# Patient Record
Sex: FEMALE | Race: OTHER | HISPANIC OR LATINO | URBAN - METROPOLITAN AREA
[De-identification: names, ages, dates, MRNs, and addresses within clinical notes are randomized per-mention and may not be internally consistent; named-entity substitution may affect disease eponyms.]

---

## 2017-07-22 ENCOUNTER — EMERGENCY (EMERGENCY)
Facility: HOSPITAL | Age: 47
LOS: 1 days | Discharge: HOME | End: 2017-07-22
Attending: EMERGENCY MEDICINE
Payer: MEDICAID

## 2017-07-22 VITALS
HEIGHT: 62 IN | TEMPERATURE: 100 F | WEIGHT: 199.96 LBS | SYSTOLIC BLOOD PRESSURE: 120 MMHG | RESPIRATION RATE: 20 BRPM | OXYGEN SATURATION: 96 % | DIASTOLIC BLOOD PRESSURE: 83 MMHG | HEART RATE: 92 BPM

## 2017-07-22 PROCEDURE — 99283 EMERGENCY DEPT VISIT LOW MDM: CPT | Mod: 25

## 2017-07-22 PROCEDURE — T1013: CPT

## 2017-07-22 PROCEDURE — 73562 X-RAY EXAM OF KNEE 3: CPT | Mod: 26,RT

## 2017-07-22 PROCEDURE — 99283 EMERGENCY DEPT VISIT LOW MDM: CPT

## 2017-07-22 PROCEDURE — 73562 X-RAY EXAM OF KNEE 3: CPT

## 2017-07-22 RX ORDER — IBUPROFEN 200 MG
1 TABLET ORAL
Qty: 28 | Refills: 0 | OUTPATIENT
Start: 2017-07-22 | End: 2017-07-29

## 2017-07-22 RX ORDER — METHOCARBAMOL 500 MG/1
2 TABLET, FILM COATED ORAL
Qty: 18 | Refills: 0 | OUTPATIENT
Start: 2017-07-22 | End: 2017-07-25

## 2017-07-22 RX ORDER — IBUPROFEN 200 MG
600 TABLET ORAL ONCE
Qty: 0 | Refills: 0 | Status: COMPLETED | OUTPATIENT
Start: 2017-07-22 | End: 2017-07-22

## 2017-07-22 RX ADMIN — Medication 600 MILLIGRAM(S): at 14:46

## 2017-07-22 NOTE — ED ADULT TRIAGE NOTE - CHIEF COMPLAINT QUOTE
pt reports pain to her right foot and knee since yesterday. pt denies injury and reports decreased ability to bend her knee

## 2017-07-22 NOTE — ED STATDOCS - ATTENDING CONTRIBUTION TO CARE
I, France Adams, performed the initial face to face bedside interview with this patient regarding history of present illness, review of symptoms and relevant past medical, social and family history.  I completed an independent physical examination.  I was the initial provider who evaluated this patient. I have signed out the follow up of any pending tests (i.e. labs, radiological studies) to the ACP.  I have communicated the patient’s plan of care and disposition with the ACP.  The history, relevant review of systems, past medical and surgical history, medical decision making, and physical examination was documented by the scribe in my presence and I attest to the accuracy of the documentation.

## 2017-07-22 NOTE — ED STATDOCS - PROGRESS NOTE DETAILS
Pts xray negative for acute bony pathology. Pt stable for d/c as per intake doc care plan. Pts xray negative for acute bony pathology. Upon further evaluation, pt describing a sciatica like pain where she has a shooting pain down her leg, worse with leg extension. Pt stable for d/c as per intake doc care plan.

## 2017-07-22 NOTE — ED STATDOCS - OBJECTIVE STATEMENT
47 y/o F pt presents to ED c/o right knee pain s/p banging her knee on her bed last night. Pain worsens with movement. No other injuries. No weakness, numbness. No further complaints at this time.

## 2017-12-09 ENCOUNTER — EMERGENCY (EMERGENCY)
Facility: HOSPITAL | Age: 47
LOS: 1 days | Discharge: HOME | End: 2017-12-09
Attending: EMERGENCY MEDICINE | Admitting: EMERGENCY MEDICINE
Payer: MEDICAID

## 2017-12-09 VITALS
WEIGHT: 190.04 LBS | TEMPERATURE: 98 F | HEART RATE: 78 BPM | HEIGHT: 63 IN | RESPIRATION RATE: 18 BRPM | DIASTOLIC BLOOD PRESSURE: 81 MMHG | SYSTOLIC BLOOD PRESSURE: 109 MMHG | OXYGEN SATURATION: 99 %

## 2017-12-09 DIAGNOSIS — F41.1 GENERALIZED ANXIETY DISORDER: ICD-10-CM

## 2017-12-09 DIAGNOSIS — R69 ILLNESS, UNSPECIFIED: ICD-10-CM

## 2017-12-09 LAB
ALBUMIN SERPL ELPH-MCNC: 4 G/DL — SIGNIFICANT CHANGE UP (ref 3.3–5.2)
ALP SERPL-CCNC: 60 U/L — SIGNIFICANT CHANGE UP (ref 40–120)
ALT FLD-CCNC: 15 U/L — SIGNIFICANT CHANGE UP
ANION GAP SERPL CALC-SCNC: 13 MMOL/L — SIGNIFICANT CHANGE UP (ref 5–17)
APPEARANCE UR: ABNORMAL
AST SERPL-CCNC: 20 U/L — SIGNIFICANT CHANGE UP
BACTERIA # UR AUTO: ABNORMAL
BASOPHILS # BLD AUTO: 0.1 K/UL — SIGNIFICANT CHANGE UP (ref 0–0.2)
BASOPHILS NFR BLD AUTO: 1.2 % — SIGNIFICANT CHANGE UP (ref 0–2)
BILIRUB SERPL-MCNC: 0.5 MG/DL — SIGNIFICANT CHANGE UP (ref 0.4–2)
BILIRUB UR-MCNC: NEGATIVE — SIGNIFICANT CHANGE UP
BUN SERPL-MCNC: 8 MG/DL — SIGNIFICANT CHANGE UP (ref 8–20)
CALCIUM SERPL-MCNC: 9 MG/DL — SIGNIFICANT CHANGE UP (ref 8.6–10.2)
CHLORIDE SERPL-SCNC: 99 MMOL/L — SIGNIFICANT CHANGE UP (ref 98–107)
CO2 SERPL-SCNC: 24 MMOL/L — SIGNIFICANT CHANGE UP (ref 22–29)
COLOR SPEC: YELLOW — SIGNIFICANT CHANGE UP
CREAT SERPL-MCNC: 0.57 MG/DL — SIGNIFICANT CHANGE UP (ref 0.5–1.3)
DIFF PNL FLD: NEGATIVE — SIGNIFICANT CHANGE UP
EOSINOPHIL # BLD AUTO: 0.1 K/UL — SIGNIFICANT CHANGE UP (ref 0–0.5)
EOSINOPHIL NFR BLD AUTO: 2.6 % — SIGNIFICANT CHANGE UP (ref 0–6)
EPI CELLS # UR: SIGNIFICANT CHANGE UP
GLUCOSE SERPL-MCNC: 109 MG/DL — SIGNIFICANT CHANGE UP (ref 70–115)
GLUCOSE UR QL: NEGATIVE MG/DL — SIGNIFICANT CHANGE UP
HCG UR QL: NEGATIVE — SIGNIFICANT CHANGE UP
HCT VFR BLD CALC: 43 % — SIGNIFICANT CHANGE UP (ref 37–47)
HGB BLD-MCNC: 13.8 G/DL — SIGNIFICANT CHANGE UP (ref 12–16)
KETONES UR-MCNC: ABNORMAL
LEUKOCYTE ESTERASE UR-ACNC: ABNORMAL
LYMPHOCYTES # BLD AUTO: 2.5 K/UL — SIGNIFICANT CHANGE UP (ref 1–4.8)
LYMPHOCYTES # BLD AUTO: 49.6 % — SIGNIFICANT CHANGE UP (ref 20–55)
MCHC RBC-ENTMCNC: 31.1 PG — HIGH (ref 27–31)
MCHC RBC-ENTMCNC: 32.1 G/DL — SIGNIFICANT CHANGE UP (ref 32–36)
MCV RBC AUTO: 96.8 FL — SIGNIFICANT CHANGE UP (ref 81–99)
MONOCYTES # BLD AUTO: 0.3 K/UL — SIGNIFICANT CHANGE UP (ref 0–0.8)
MONOCYTES NFR BLD AUTO: 6.4 % — SIGNIFICANT CHANGE UP (ref 3–10)
NEUTROPHILS # BLD AUTO: 2 K/UL — SIGNIFICANT CHANGE UP (ref 1.8–8)
NEUTROPHILS NFR BLD AUTO: 39.8 % — SIGNIFICANT CHANGE UP (ref 37–73)
NITRITE UR-MCNC: NEGATIVE — SIGNIFICANT CHANGE UP
PH UR: 6 — SIGNIFICANT CHANGE UP (ref 5–8)
PLATELET # BLD AUTO: 281 K/UL — SIGNIFICANT CHANGE UP (ref 150–400)
POTASSIUM SERPL-MCNC: 4 MMOL/L — SIGNIFICANT CHANGE UP (ref 3.5–5.3)
POTASSIUM SERPL-SCNC: 4 MMOL/L — SIGNIFICANT CHANGE UP (ref 3.5–5.3)
PROT SERPL-MCNC: 7.1 G/DL — SIGNIFICANT CHANGE UP (ref 6.6–8.7)
PROT UR-MCNC: 15 MG/DL
RBC # BLD: 4.44 M/UL — SIGNIFICANT CHANGE UP (ref 4.4–5.2)
RBC # FLD: 13.2 % — SIGNIFICANT CHANGE UP (ref 11–15.6)
RBC CASTS # UR COMP ASSIST: SIGNIFICANT CHANGE UP /HPF (ref 0–4)
SODIUM SERPL-SCNC: 136 MMOL/L — SIGNIFICANT CHANGE UP (ref 135–145)
SP GR SPEC: 1.02 — SIGNIFICANT CHANGE UP (ref 1.01–1.02)
T3 SERPL-MCNC: 90 NG/DL — SIGNIFICANT CHANGE UP (ref 80–200)
T4 AB SER-ACNC: 7.2 UG/DL — SIGNIFICANT CHANGE UP (ref 4.5–12)
TROPONIN T SERPL-MCNC: <0.01 NG/ML — SIGNIFICANT CHANGE UP (ref 0–0.06)
TSH SERPL-MCNC: 0.74 UIU/ML — SIGNIFICANT CHANGE UP (ref 0.27–4.2)
UROBILINOGEN FLD QL: NEGATIVE MG/DL — SIGNIFICANT CHANGE UP
WBC # BLD: 5 K/UL — SIGNIFICANT CHANGE UP (ref 4.8–10.8)
WBC # FLD AUTO: 5 K/UL — SIGNIFICANT CHANGE UP (ref 4.8–10.8)
WBC UR QL: SIGNIFICANT CHANGE UP

## 2017-12-09 PROCEDURE — 81001 URINALYSIS AUTO W/SCOPE: CPT

## 2017-12-09 PROCEDURE — 84443 ASSAY THYROID STIM HORMONE: CPT

## 2017-12-09 PROCEDURE — 93005 ELECTROCARDIOGRAM TRACING: CPT

## 2017-12-09 PROCEDURE — 81025 URINE PREGNANCY TEST: CPT

## 2017-12-09 PROCEDURE — 90792 PSYCH DIAG EVAL W/MED SRVCS: CPT

## 2017-12-09 PROCEDURE — 99284 EMERGENCY DEPT VISIT MOD MDM: CPT | Mod: 25

## 2017-12-09 PROCEDURE — 85027 COMPLETE CBC AUTOMATED: CPT

## 2017-12-09 PROCEDURE — T1013: CPT

## 2017-12-09 PROCEDURE — 84484 ASSAY OF TROPONIN QUANT: CPT

## 2017-12-09 PROCEDURE — 84480 ASSAY TRIIODOTHYRONINE (T3): CPT

## 2017-12-09 PROCEDURE — 80053 COMPREHEN METABOLIC PANEL: CPT

## 2017-12-09 PROCEDURE — 93010 ELECTROCARDIOGRAM REPORT: CPT

## 2017-12-09 PROCEDURE — 36415 COLL VENOUS BLD VENIPUNCTURE: CPT

## 2017-12-09 PROCEDURE — 84436 ASSAY OF TOTAL THYROXINE: CPT

## 2017-12-09 PROCEDURE — 99284 EMERGENCY DEPT VISIT MOD MDM: CPT

## 2017-12-09 RX ORDER — SERTRALINE 25 MG/1
1 TABLET, FILM COATED ORAL
Qty: 7 | Refills: 0 | OUTPATIENT
Start: 2017-12-09 | End: 2017-12-16

## 2017-12-09 NOTE — ED BEHAVIORAL HEALTH ASSESSMENT NOTE - SUMMARY
46 year old DF who no prior history of psychiatric treatment, went to her PMD recently for anxiety/ panic like symptoms that she describes as worry, anxiety, tremulousness, palpitations sometimes occurring once daily. She cannot describe any events in her personal life that would trigger this except she believes she is going into perimenopause.

## 2017-12-09 NOTE — ED STATDOCS - PROGRESS NOTE DETAILS
psychiatry called, will see pt after tests are completed. Attending note. Patient has no acute medical problem. Psych feels patient is having anxiety disorder will place on zoloft

## 2017-12-09 NOTE — ED STATDOCS - ATTENDING CONTRIBUTION TO CARE
I, Rogers Coon, performed the initial face to face bedside interview with this patient regarding history of present illness, review of symptoms and relevant past medical, social and family history.  I completed an independent physical examination.  I was the initial provider who evaluated this patient. I have signed out the follow up of any pending tests (i.e. labs, radiological studies) to the ACP.  I have communicated the patient’s plan of care and disposition with the ACP.

## 2017-12-09 NOTE — ED STATDOCS - OBJECTIVE STATEMENT
48 y/o F presents to the ED c/o intermittent anxiety attacks today. She states that during the attack, her heart beat was rapid and she "felt like something is going to happen". After the anxiety attack subsided, pt states she was left with a nervous feeling. Pt states these attacks onset at random. Pt had her sx evaluated by PMD but received no prescription for medication. Denies fever, chills, recent stress. Currently takes Prednisone. No psychiatric PMHx. 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 LNMP 1 week ago. Non-smoker, no EtOH use. NKDA. No further complaints at this time. PMD is [facility].  utilized to obtain history.

## 2017-12-09 NOTE — ED BEHAVIORAL HEALTH ASSESSMENT NOTE - SUICIDE PROTECTIVE FACTORS
Future oriented/Identifies reasons for living/Responsibility to family and others/Supportive social network or family/Ability to cope with stress

## 2017-12-09 NOTE — ED BEHAVIORAL HEALTH ASSESSMENT NOTE - HPI (INCLUDE ILLNESS QUALITY, SEVERITY, DURATION, TIMING, CONTEXT, MODIFYING FACTORS, ASSOCIATED SIGNS AND SYMPTOMS)
The patient describes herself as a happy person but recently believes she is going into menopause over past 3 three months. She has had bouts of anxiety over the past three months almost daily with some palpitations, feeling nervous and some shakiness. She does not equate this to any changes in her life. She has been on prednisone for years since surgery for a pituitary tumor at age 17years. There is no prior history of psychiatric treatment

## 2017-12-09 NOTE — ED ADULT NURSE NOTE - CHIEF COMPLAINT QUOTE
pt alert and awake x3, arrived with feelings of anxiety. states gets shaky and heart palpitations while driving and at night, has stress in her life. denies chest pain, denies SI/HI

## 2017-12-09 NOTE — ED ADULT NURSE NOTE - OBJECTIVE STATEMENT
pt presents to ED with feeling anxious. pt states she thinks something is going to happen, feels nervous and then she feels like her heart is racing. denies chest pain/sob. breathing si even and unlabored, a&ox3, will continue to monitor and reassess

## 2017-12-09 NOTE — ED BEHAVIORAL HEALTH ASSESSMENT NOTE - DESCRIPTION
Medical workup done and patient to be given script for Zoloft 25mg po daily. She does not feel she needs counseling but given number of family service in case she changes her mind. Follow-up on Zoloft will be with her PMD in community. 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

## 2017-12-12 VITALS
RESPIRATION RATE: 18 BRPM | HEART RATE: 102 BPM | OXYGEN SATURATION: 98 % | SYSTOLIC BLOOD PRESSURE: 117 MMHG | TEMPERATURE: 98 F | DIASTOLIC BLOOD PRESSURE: 60 MMHG

## 2018-09-23 ENCOUNTER — EMERGENCY (EMERGENCY)
Facility: HOSPITAL | Age: 48
LOS: 1 days | Discharge: HOME | End: 2018-09-23
Attending: EMERGENCY MEDICINE
Payer: MEDICAID

## 2018-09-23 VITALS
OXYGEN SATURATION: 96 % | HEART RATE: 69 BPM | TEMPERATURE: 98 F | RESPIRATION RATE: 18 BRPM | SYSTOLIC BLOOD PRESSURE: 106 MMHG | DIASTOLIC BLOOD PRESSURE: 74 MMHG

## 2018-09-23 VITALS — WEIGHT: 210.1 LBS | HEIGHT: 62 IN

## 2018-09-23 PROCEDURE — 70450 CT HEAD/BRAIN W/O DYE: CPT

## 2018-09-23 PROCEDURE — 72125 CT NECK SPINE W/O DYE: CPT

## 2018-09-23 PROCEDURE — 70450 CT HEAD/BRAIN W/O DYE: CPT | Mod: 26

## 2018-09-23 PROCEDURE — 99284 EMERGENCY DEPT VISIT MOD MDM: CPT | Mod: 25

## 2018-09-23 PROCEDURE — 72125 CT NECK SPINE W/O DYE: CPT | Mod: 26

## 2018-09-23 PROCEDURE — T1013: CPT

## 2018-09-23 PROCEDURE — 99284 EMERGENCY DEPT VISIT MOD MDM: CPT

## 2018-09-23 RX ORDER — IBUPROFEN 200 MG
1 TABLET ORAL
Qty: 40 | Refills: 0 | OUTPATIENT
Start: 2018-09-23 | End: 2018-10-02

## 2018-09-23 NOTE — ED ADULT TRIAGE NOTE - CHIEF COMPLAINT QUOTE
"I have pain in my head, neck, arm and leg that started 2 weeks ago. I saw my doctor 2 weeks ago she gave me Ibuprofen 600 but only helps for a few hours. " Pt states the doctor told her it might be neurological.  Pt denies HBP.

## 2018-09-23 NOTE — ED PROVIDER NOTE - ATTENDING CONTRIBUTION TO CARE
49 yo hx of F hx of pituitary tumor presented with headache and neck pain. no nuasea. no vomting. on exam, no neurological deficit. Mild neck tendneress on palpation. CT head wnl. Rick recommend to get MRI outpatient and follow up with PMD.

## 2018-09-23 NOTE — ED PROVIDER NOTE - OBJECTIVE STATEMENT
47 yo M pmh pituitary tumor s/p resection followed by NS with yearly MRI up until 5-10 years ago presented to ED with c/o  left sided HA, eye pressure and pain down left arm x 3 weeks. Pt denies any injury. NO weakness. NO fevers/chills. No HA. No CP or SOB. Pt was seen by [facility] clinic and started on Motrin with relief but concerned that s/s persist.

## 2018-09-23 NOTE — ED ADULT NURSE NOTE - OBJECTIVE STATEMENT
Patient arrived to ED today with c/o head pain, neck pain, arm pain and leg pain for the past 2 weeks.  Patient states she is taking Motrin with no relief.

## 2018-09-23 NOTE — ED PROVIDER NOTE - CHPI ED SYMPTOMS NEG
no weakness/no change in level of consciousness/no vomiting/no dizziness/no fever/no confusion/no loss of consciousness/no nausea/no numbness/no blurred vision

## 2018-09-23 NOTE — ED PROVIDER NOTE - MEDICAL DECISION MAKING DETAILS
Left sided HA, eye pressure, and neck pain with h/o pituitary tumor. Will check CT and if normal refer to out-pt neurology.

## 2019-02-17 ENCOUNTER — EMERGENCY (EMERGENCY)
Facility: HOSPITAL | Age: 49
LOS: 1 days | Discharge: HOME | End: 2019-02-17
Attending: EMERGENCY MEDICINE
Payer: MEDICAID

## 2019-02-17 VITALS
HEART RATE: 80 BPM | OXYGEN SATURATION: 98 % | DIASTOLIC BLOOD PRESSURE: 78 MMHG | TEMPERATURE: 99 F | SYSTOLIC BLOOD PRESSURE: 117 MMHG | RESPIRATION RATE: 18 BRPM

## 2019-02-17 VITALS
TEMPERATURE: 98 F | OXYGEN SATURATION: 97 % | DIASTOLIC BLOOD PRESSURE: 72 MMHG | WEIGHT: 214.95 LBS | SYSTOLIC BLOOD PRESSURE: 120 MMHG | HEART RATE: 85 BPM | RESPIRATION RATE: 18 BRPM

## 2019-02-17 DIAGNOSIS — Z90.49 ACQUIRED ABSENCE OF OTHER SPECIFIED PARTS OF DIGESTIVE TRACT: Chronic | ICD-10-CM

## 2019-02-17 LAB
ALBUMIN SERPL ELPH-MCNC: 3.7 G/DL — SIGNIFICANT CHANGE UP (ref 3.3–5.2)
ALP SERPL-CCNC: 59 U/L — SIGNIFICANT CHANGE UP (ref 40–120)
ALT FLD-CCNC: 13 U/L — SIGNIFICANT CHANGE UP
ANION GAP SERPL CALC-SCNC: 12 MMOL/L — SIGNIFICANT CHANGE UP (ref 5–17)
AST SERPL-CCNC: 20 U/L — SIGNIFICANT CHANGE UP
BILIRUB SERPL-MCNC: 0.3 MG/DL — LOW (ref 0.4–2)
BUN SERPL-MCNC: 8 MG/DL — SIGNIFICANT CHANGE UP (ref 8–20)
CALCIUM SERPL-MCNC: 8 MG/DL — LOW (ref 8.6–10.2)
CHLORIDE SERPL-SCNC: 103 MMOL/L — SIGNIFICANT CHANGE UP (ref 98–107)
CO2 SERPL-SCNC: 24 MMOL/L — SIGNIFICANT CHANGE UP (ref 22–29)
CREAT SERPL-MCNC: 0.61 MG/DL — SIGNIFICANT CHANGE UP (ref 0.5–1.3)
GLUCOSE SERPL-MCNC: 89 MG/DL — SIGNIFICANT CHANGE UP (ref 70–115)
HCT VFR BLD CALC: 41.3 % — SIGNIFICANT CHANGE UP (ref 37–47)
HGB BLD-MCNC: 13.7 G/DL — SIGNIFICANT CHANGE UP (ref 12–16)
LIDOCAIN IGE QN: 23 U/L — SIGNIFICANT CHANGE UP (ref 22–51)
MAGNESIUM SERPL-MCNC: 2.3 MG/DL — SIGNIFICANT CHANGE UP (ref 1.6–2.6)
MCHC RBC-ENTMCNC: 32.1 PG — HIGH (ref 27–31)
MCHC RBC-ENTMCNC: 33.2 G/DL — SIGNIFICANT CHANGE UP (ref 32–36)
MCV RBC AUTO: 96.7 FL — SIGNIFICANT CHANGE UP (ref 81–99)
PLATELET # BLD AUTO: 263 K/UL — SIGNIFICANT CHANGE UP (ref 150–400)
POTASSIUM SERPL-MCNC: 3.2 MMOL/L — LOW (ref 3.5–5.3)
POTASSIUM SERPL-SCNC: 3.2 MMOL/L — LOW (ref 3.5–5.3)
PROT SERPL-MCNC: 6.9 G/DL — SIGNIFICANT CHANGE UP (ref 6.6–8.7)
RBC # BLD: 4.27 M/UL — LOW (ref 4.4–5.2)
RBC # FLD: 13.4 % — SIGNIFICANT CHANGE UP (ref 11–15.6)
SODIUM SERPL-SCNC: 139 MMOL/L — SIGNIFICANT CHANGE UP (ref 135–145)
WBC # BLD: 4.6 K/UL — LOW (ref 4.8–10.8)
WBC # FLD AUTO: 4.6 K/UL — LOW (ref 4.8–10.8)

## 2019-02-17 PROCEDURE — 84702 CHORIONIC GONADOTROPIN TEST: CPT

## 2019-02-17 PROCEDURE — 83735 ASSAY OF MAGNESIUM: CPT

## 2019-02-17 PROCEDURE — 99284 EMERGENCY DEPT VISIT MOD MDM: CPT | Mod: 25

## 2019-02-17 PROCEDURE — 96374 THER/PROPH/DIAG INJ IV PUSH: CPT | Mod: XU

## 2019-02-17 PROCEDURE — 80053 COMPREHEN METABOLIC PANEL: CPT

## 2019-02-17 PROCEDURE — 74177 CT ABD & PELVIS W/CONTRAST: CPT

## 2019-02-17 PROCEDURE — 36415 COLL VENOUS BLD VENIPUNCTURE: CPT

## 2019-02-17 PROCEDURE — 85027 COMPLETE CBC AUTOMATED: CPT

## 2019-02-17 PROCEDURE — 74177 CT ABD & PELVIS W/CONTRAST: CPT | Mod: 26

## 2019-02-17 PROCEDURE — 99285 EMERGENCY DEPT VISIT HI MDM: CPT | Mod: 25

## 2019-02-17 PROCEDURE — 83690 ASSAY OF LIPASE: CPT

## 2019-02-17 PROCEDURE — 96361 HYDRATE IV INFUSION ADD-ON: CPT

## 2019-02-17 PROCEDURE — 93010 ELECTROCARDIOGRAM REPORT: CPT

## 2019-02-17 PROCEDURE — 93005 ELECTROCARDIOGRAM TRACING: CPT

## 2019-02-17 RX ORDER — MORPHINE SULFATE 50 MG/1
4 CAPSULE, EXTENDED RELEASE ORAL ONCE
Qty: 0 | Refills: 0 | Status: DISCONTINUED | OUTPATIENT
Start: 2019-02-17 | End: 2019-02-17

## 2019-02-17 RX ORDER — LEVOTHYROXINE SODIUM 125 MCG
1 TABLET ORAL
Qty: 0 | Refills: 0 | COMMUNITY

## 2019-02-17 RX ORDER — SODIUM CHLORIDE 9 MG/ML
1000 INJECTION INTRAMUSCULAR; INTRAVENOUS; SUBCUTANEOUS ONCE
Qty: 0 | Refills: 0 | Status: COMPLETED | OUTPATIENT
Start: 2019-02-17 | End: 2019-02-17

## 2019-02-17 RX ORDER — NORGESTIMATE AND ETHINYL ESTRADIOL 7DAYSX3 LO
1 KIT ORAL
Qty: 0 | Refills: 0 | COMMUNITY

## 2019-02-17 RX ORDER — POTASSIUM CHLORIDE 20 MEQ
40 PACKET (EA) ORAL ONCE
Qty: 0 | Refills: 0 | Status: COMPLETED | OUTPATIENT
Start: 2019-02-17 | End: 2019-02-17

## 2019-02-17 RX ADMIN — MORPHINE SULFATE 4 MILLIGRAM(S): 50 CAPSULE, EXTENDED RELEASE ORAL at 10:08

## 2019-02-17 RX ADMIN — SODIUM CHLORIDE 1000 MILLILITER(S): 9 INJECTION INTRAMUSCULAR; INTRAVENOUS; SUBCUTANEOUS at 10:45

## 2019-02-17 RX ADMIN — Medication 40 MILLIEQUIVALENT(S): at 11:43

## 2019-02-17 RX ADMIN — SODIUM CHLORIDE 1000 MILLILITER(S): 9 INJECTION INTRAMUSCULAR; INTRAVENOUS; SUBCUTANEOUS at 09:45

## 2019-02-17 RX ADMIN — MORPHINE SULFATE 4 MILLIGRAM(S): 50 CAPSULE, EXTENDED RELEASE ORAL at 09:45

## 2019-02-17 NOTE — ED PROVIDER NOTE - CROS ED ROS STATEMENT
----- Message from Tiffanie Wallace sent at 2/27/2017  3:10 PM EST -----  Patient called to ask if she can take a different BP medication. Please advise. Thank you!    all other ROS negative except as per HPI

## 2019-02-17 NOTE — ED ADULT NURSE NOTE - NSIMPLEMENTINTERV_GEN_ALL_ED
Implemented All Universal Safety Interventions:  [place] to call system. Call bell, personal items and telephone within reach. Instruct patient to call for assistance. Room bathroom lighting operational. Non-slip footwear when patient is off stretcher. Physically safe environment: no spills, clutter or unnecessary equipment. Stretcher in lowest position, wheels locked, appropriate side rails in place.

## 2019-02-17 NOTE — ED ADULT NURSE NOTE - CHIEF COMPLAINT QUOTE
I have abd pain started on fri. I have  diarrhea gas pain and vomiting.  I took pepto bismol.  this morning when I went to the bathroom my stool was black

## 2019-02-17 NOTE — ED PROVIDER NOTE - PROGRESS NOTE DETAILS
Pt feeling much better.  REports that 2 family members sick recently w/ same. She was able to PO challenge in ER and is requesting to leave. Pt advised of findings,  recommended to continue supportive care and to f/u with her doctor.

## 2019-02-17 NOTE — ED PROVIDER NOTE - OBJECTIVE STATEMENT
48yoF with hypothyroidism, on chronic [id number]  s/p pituitary tumor removal; c/o nausea/ vomiting /diarrhea on Friday;  nausea resolved but she c/o new only LLQ pain;  she notes persistent diarrhea;  took pepto bismol.  Pt states she went 3 times . Pain 7-8/10;  She has been tolerating PO.   no dysuria; no vaginal discharge;  no hematuria; Pt reports colonoscopy 5 years ago which was normal. Denies recent travel.  Pt denies any recent antibiotic use int eh past several months.    PMD: Dr. [facility]

## 2019-08-31 ENCOUNTER — EMERGENCY (EMERGENCY)
Facility: HOSPITAL | Age: 49
LOS: 1 days | Discharge: HOME | End: 2019-08-31
Attending: EMERGENCY MEDICINE
Payer: MEDICAID

## 2019-08-31 VITALS
SYSTOLIC BLOOD PRESSURE: 136 MMHG | HEART RATE: 76 BPM | DIASTOLIC BLOOD PRESSURE: 79 MMHG | HEIGHT: 61 IN | OXYGEN SATURATION: 96 % | WEIGHT: 216.93 LBS | TEMPERATURE: 99 F | RESPIRATION RATE: 20 BRPM

## 2019-08-31 DIAGNOSIS — Z90.49 ACQUIRED ABSENCE OF OTHER SPECIFIED PARTS OF DIGESTIVE TRACT: Chronic | ICD-10-CM

## 2019-08-31 PROCEDURE — 99283 EMERGENCY DEPT VISIT LOW MDM: CPT

## 2019-08-31 RX ORDER — SUCRALFATE 1 G
1 TABLET ORAL
Qty: 120 | Refills: 0
Start: 2019-08-31 | End: 2019-09-29

## 2019-08-31 RX ORDER — PANTOPRAZOLE SODIUM 20 MG/1
1 TABLET, DELAYED RELEASE ORAL
Qty: 30 | Refills: 0
Start: 2019-08-31 | End: 2019-09-29

## 2019-08-31 RX ORDER — PANTOPRAZOLE SODIUM 20 MG/1
40 TABLET, DELAYED RELEASE ORAL ONCE
Refills: 0 | Status: COMPLETED | OUTPATIENT
Start: 2019-08-31 | End: 2019-08-31

## 2019-08-31 RX ORDER — SUCRALFATE 1 G
1 TABLET ORAL ONCE
Refills: 0 | Status: COMPLETED | OUTPATIENT
Start: 2019-08-31 | End: 2019-08-31

## 2019-08-31 RX ADMIN — PANTOPRAZOLE SODIUM 40 MILLIGRAM(S): 20 TABLET, DELAYED RELEASE ORAL at 11:16

## 2019-08-31 RX ADMIN — Medication 1 GRAM(S): at 11:16

## 2019-08-31 NOTE — ED STATDOCS - CARE PLAN
Principal Discharge DX:	Chronic gastritis, presence of bleeding unspecified, unspecified gastritis type

## 2019-08-31 NOTE — ED STATDOCS - CARE PROVIDER_API CALL
Marco Montoya)  Gastroenterology; Internal Medicine  [address] [place] [address]  Phone: [phone]  Fax: [phone]  Follow Up Time:

## 2019-08-31 NOTE — ED STATDOCS - PHYSICAL EXAMINATION
Const: Awake, alert and oriented. In no acute distress. Well appearing.  HEENT: NC/AT. Moist mucous membranes.  Eyes: No scleral icterus. EOMI.  Neck:. Soft and supple. Full ROM without pain.  Cardiac: Regular rate and regular rhythm. +S1/S2. No murmurs. Peripheral pulses 2+ and symmetric. No LE edema.  Resp: Speaking in full sentences. No evidence of respiratory distress. No wheezes, rales or rhonchi.  Abd: Soft, +LUQ TTP with guarding, non-distended. Normal bowel sounds in all 4 quadrants.   Back: Spine midline and non-tender. No CVAT.  Skin: No rashes, abrasions or lacerations.  Neuro: Awake, alert & oriented x 3. Moves all extremities symmetrically.

## 2019-08-31 NOTE — ED STATDOCS - NS ED ROS FT
Const: Denies fever, chills  HEENT: Denies blurry vision, sore throat  Neck: Denies neck pain/stiffness  Resp: Denies coughing, SOB  Cardiovascular: Denies CP, palpitations, LE edema  GI: + LUQ pain. Denies nausea, vomiting, diarrhea, constipation, blood in stool  : Denies urinary frequency/urgency/dysuria, hematuria  MSK: Denies back pain  Neuro: Denies HA, dizziness, numbness, weakness  Skin: Denies rashes.

## 2019-08-31 NOTE — ED STATDOCS - PATIENT PORTAL LINK FT
You can access the FollowMyHealth Patient Portal offered by [facility] by registering at the following website: [email or web address]. By joining [employer]’s FollowMyHealth portal, you will also be able to view your health information using other applications (apps) compatible with our system.

## 2019-08-31 NOTE — ED STATDOCS - CLINICAL SUMMARY MEDICAL DECISION MAKING FREE TEXT BOX
Patient presents complaining of one month of intermittent burning pain in the left upper quadrant, chronically on prednisone, had improvement with an unknown medication by her PMD, now with worsening pain, mostly at night. No rashes, no nausea, normal bowel movement, abdomen soft, only tender in LUQ, no indication for imaging at this time. WIll treat with protonix and carafate, send Rx to pharmacy and advised follow up to PMD and gastroenterologist. Patient verbalizes understanding and is comforatble with discharge at this time.

## 2019-08-31 NOTE — ED STATDOCS - OBJECTIVE STATEMENT
48 y/o F with PMH pituitary tumor presents complaining of LUQ pain x 1 month that acutely worsened last night that she describes as a burning pain radiating to her left lower quadrant, but not to her back. She saw her PMD at the [facility] clinic last month for this problem and was given a medication that did help her, but she is now having the same pain. She cannot recall the name of the medication, but only took it for 8 days. She denies associated chills, fevers, nausea, vomiting, diarrhea, constipation, blood in stool, rash.

## 2019-12-11 ENCOUNTER — OUTPATIENT (OUTPATIENT)
Dept: OUTPATIENT SERVICES | Facility: HOSPITAL | Age: 49
LOS: 1 days | End: 2019-12-11
Payer: SELF-PAY

## 2019-12-11 ENCOUNTER — APPOINTMENT (OUTPATIENT)
Dept: CARDIOLOGY | Facility: CLINIC | Age: 49
End: 2019-12-11

## 2019-12-11 VITALS
WEIGHT: 221 LBS | HEART RATE: 59 BPM | BODY MASS INDEX: 41.72 KG/M2 | HEIGHT: 61 IN | DIASTOLIC BLOOD PRESSURE: 68 MMHG | RESPIRATION RATE: 16 BRPM | SYSTOLIC BLOOD PRESSURE: 119 MMHG

## 2019-12-11 DIAGNOSIS — R94.31 ABNORMAL ELECTROCARDIOGRAM [ECG] [EKG]: ICD-10-CM

## 2019-12-11 DIAGNOSIS — I25.10 ATHEROSCLEROTIC HEART DISEASE OF NATIVE CORONARY ARTERY WITHOUT ANGINA PECTORIS: ICD-10-CM

## 2019-12-11 DIAGNOSIS — Z90.49 ACQUIRED ABSENCE OF OTHER SPECIFIED PARTS OF DIGESTIVE TRACT: Chronic | ICD-10-CM

## 2019-12-11 PROBLEM — Z00.00 ENCOUNTER FOR PREVENTIVE HEALTH EXAMINATION: Status: ACTIVE | Noted: 2019-12-11

## 2019-12-11 PROCEDURE — G0463: CPT

## 2019-12-19 ENCOUNTER — FORM ENCOUNTER (OUTPATIENT)
Age: 49
End: 2019-12-19

## 2019-12-20 ENCOUNTER — OUTPATIENT (OUTPATIENT)
Dept: OUTPATIENT SERVICES | Facility: HOSPITAL | Age: 49
LOS: 1 days | End: 2019-12-20
Payer: SELF-PAY

## 2019-12-20 DIAGNOSIS — Z90.49 ACQUIRED ABSENCE OF OTHER SPECIFIED PARTS OF DIGESTIVE TRACT: Chronic | ICD-10-CM

## 2019-12-20 DIAGNOSIS — I25.10 ATHEROSCLEROTIC HEART DISEASE OF NATIVE CORONARY ARTERY WITHOUT ANGINA PECTORIS: ICD-10-CM

## 2019-12-20 PROCEDURE — 93306 TTE W/DOPPLER COMPLETE: CPT

## 2019-12-20 PROCEDURE — 93017 CV STRESS TEST TRACING ONLY: CPT

## 2019-12-20 PROCEDURE — 93306 TTE W/DOPPLER COMPLETE: CPT | Mod: 26

## 2020-02-26 ENCOUNTER — APPOINTMENT (OUTPATIENT)
Dept: CARDIOLOGY | Facility: CLINIC | Age: 50
End: 2020-02-26

## 2020-03-10 DIAGNOSIS — G43.919 MIGRAINE, UNSPECIFIED, INTRACTABLE, W/OUT STATUS MIGRAINOSUS: ICD-10-CM

## 2020-03-10 DIAGNOSIS — M17.11 UNILATERAL PRIMARY OSTEOARTHRITIS, RIGHT KNEE: ICD-10-CM

## 2020-03-10 DIAGNOSIS — E66.01 MORBID (SEVERE) OBESITY DUE TO EXCESS CALORIES: ICD-10-CM

## 2020-03-10 DIAGNOSIS — Z87.898 PERSONAL HISTORY OF OTHER SPECIFIED CONDITIONS: ICD-10-CM

## 2020-03-10 DIAGNOSIS — Z86.018 PERSONAL HISTORY OF OTHER BENIGN NEOPLASM: ICD-10-CM

## 2020-03-10 DIAGNOSIS — E78.2 MIXED HYPERLIPIDEMIA: ICD-10-CM

## 2020-03-10 DIAGNOSIS — E83.51 HYPOCALCEMIA: ICD-10-CM

## 2020-03-10 DIAGNOSIS — R73.03 PREDIABETES.: ICD-10-CM

## 2020-03-10 DIAGNOSIS — N95.1 MENOPAUSAL AND FEMALE CLIMACTERIC STATES: ICD-10-CM

## 2020-03-10 DIAGNOSIS — E03.9 HYPOTHYROIDISM, UNSPECIFIED: ICD-10-CM

## 2020-03-10 RX ORDER — LEVOTHYROXINE SODIUM 137 UG/1
TABLET ORAL
Refills: 0 | Status: ACTIVE | COMMUNITY

## 2020-03-10 RX ORDER — NORGESTIMATE AND ETHINYL ESTRADIOL 7DAYSX3 LO
KIT ORAL
Refills: 0 | Status: ACTIVE | COMMUNITY

## 2020-03-10 RX ORDER — PREDNISONE 10 MG
TABLET ORAL
Refills: 0 | Status: ACTIVE | COMMUNITY

## 2020-12-09 NOTE — ED ADULT TRIAGE NOTE - NS ED NURSE DIRECT TO ROOM YN
[facility], [address], [address] [facility]  Phone: [phone]  Fax: [phone]    OFFICE VISIT:  2020    Ada Burnette - : 1952    Dear LALITA Miranda,     I appreciate the opportunity of participating in the care of Ada Burnette. She is a very pleasant 76 y.o. female who I had the opportunity of seeing in my office today, 20. Records from your office have been obtained and reviewed. Reason For Visit:  Alondra Chang is a 76 y.o. female who is here for New Patient (no cardiac symptoms) and Atrial Fibrillation  Was managed in the hospital  with confusion. Found to have occipital lobe infarct. Found to be in atrial fibrillation. 2D echo 10/2/2020 showed  Technically difficult study   Moderate left ventricular enlargement with systolic function within normal   limits with EF of 55%   Moderate concentric left ventricular hypertrophy with transmitral tissue   Dopplers consistent with severe/restrictive Dellie Eaton 3] diastolic   dysfunction   Moderate left atrial enlargement   Tricuspid aortic valve with adequate cusp separation and neither stenosis   or insufficiency   Mild thickening of a normally mobile mitral valve with mild regurgitation   Nonvisualization pulmonic valve   Poor visualization of the right-sided chambers   Mild tricuspid regurgitation with RVSP estimate of 47 mmHg   Significantly dilated IVC with failure respiratory motion consistent with   marked elevation of right atrial filling pressures in excess of 20 mmHg   No significant pericardial effusion   Aortic root dimensions within normal limits with mild dilatation of the   ascending aorta at 3.4 cm   Definity contrast utilized to better define endocardial borders    Patient was discharged on Eliquis for anticoagulation. She is had follow-up with neurosurgery. CT had is stable. She is here today for evaluation due to arrhythmia.      She states she had a light PCP: Senait Moulton MD    Last appt: 11/23/2020  No future appointments. Requested Prescriptions     Pending Prescriptions Disp Refills    metoprolol succinate (TOPROL-XL) 50 mg XL tablet 30 Tab 1     Sig: Take 1 Tab by mouth daily. Request for a 30 or 90 day supply? Provider Discretion    Pharmacy: Confirmed    Other Comments:  Medication refill request via fax. Patient changing pharmacy. stroke a few years a go. Had very highBP but had abdominal mass thast was removed  BP improved after that. She reports that she has no significant residual symptoms from her previous strokes. She is taking her medications as prescribed      Subjective  Starling Simpler has no exertional chest pain, pressure, burning or squeezing. She has MCDANIEL but attributes it to her weight. Usually no resting shortness of breath. Denies any known sleep apnea  She is able to lie flat without evidence of orthopnea or paroxysmal nocturnal dyspnea. No new  symptomatic tachy- or imelda-arrhythmia. No numbness or weakness to suggest cerebrovascular accident or transient ischemic attack. Reports mild BLE edema.      Warner Herrmann has the following history as recorded in [facility]:  Patient Active Problem List    Diagnosis Date Noted    Hypertension     Hyperlipidemia     Occipital infarction ([facility] 10/02/2020     Past Medical History:   Diagnosis Date    CHF (congestive heart failure) ([facility]     COPD (chronic obstructive pulmonary disease) ([facility]     Hyperlipidemia     Hypertension     Thyroid disease      Past Surgical History:   Procedure Laterality Date    TONSILLECTOMY      TUMOR REMOVAL       Family History   Problem Relation Age of Onset    Pacemaker Mother      Social History     Tobacco Use    Smoking status: Never Smoker    Smokeless tobacco: Never Used   Substance Use Topics    Alcohol use: Not Currently        Allergies: Niacin and related    Current Outpatient Medications   Medication Sig Dispense Refill    apixaban (ELIQUIS) 5 MG TABS tablet Take 1 tablet by mouth 2 times daily 60 tablet 0    aspirin EC 81 MG EC tablet Take 1 tablet by mouth daily 90 tablet 0    atorvastatin (LIPITOR) 20 MG tablet Take 20 mg by mouth daily      metoprolol (LOPRESSOR) 100 MG tablet Take 100 mg by mouth 2 times daily      escitalopram (LEXAPRO) 10 MG tablet Take 10 mg by mouth daily      methIMAzole (TAPAZOLE) 5 MG tablet Take 5 mg by mouth 2 times daily      HYDROcodone-acetaminophen (NORCO) 7.5-325 MG per tablet Take 1 tablet by mouth every 6 hours as needed for Pain. No current facility-administered medications for this visit. Review of Systems  Constitutional - no significant activity change, appetite change, or unexpected weight change. No fever, chills or diaphoresis. No fatigue. HEENT - no significant rhinorrhea or epistaxis. No tinnitus or significant hearing loss. Eyes - no sudden vision change or amaurosis. Respiratory - no significant wheezing, stridor, apnea or cough. + dyspnea on exertion no resting shortness of breath. Cardiovascular - no exertional chest pain, orthopnea or PND. No sensation of arrhythmia or slow heart rate. No claudication or leg edema. Gastrointestinal - no abdominal swelling or pain. No blood in stool. No severe constipation, diarrhea, nausea, or vomiting. Genitourinary - no difficulty urinating, dysuria, frequency, or urgency. No flank pain or hematuria. No previous radiation or chemotherapy  Musculoskeletal - no back pain, gait disturbance, or myalgia. + Knee pain  Skin - no color change or rash. No pallor. No new surgical incision. Neurologic - no speech difficulty, facial asymmetry or lateralizing weakness. No seizures, presyncope, syncope, or significant dizziness. Hematologic - no easy bruising or excessive bleeding. Psychiatric - no severe anxiety or insomnia. No confusion. All other review of systems are negative. Objective  Vital Signs - /88   Pulse 81   Ht 5' 2\" (1.575 m)   Wt (!) 328 lb (148.8 kg)   BMI 59.99 kg/m²   General - Tanya Sleeper is alert, cooperative, and pleasant. Well groomed. No acute distress. Body habitus is morbidly obese. HEENT - The head is normocephalic. No circumoral cyanosis.   Dentition is normal.   Ears and nose externally normal. No abnormal scars or lesions noted  EYES -  No Xanthelasma, no arcus senilis, no conjunctival hemorrhages or discharge. Neck - Supple, without increased jugular venous pressures. No carotid bruits. No mass. Respiratory - Lungs are clear bilaterally. No wheezes or rales. Normal effort without use of accessory muscles. No tactile fremitus on palpation  Cardiovascular - Heart has regular rhythm and rate. No murmurs, rubs or gallops. + pedal pulses and no varicosities. Abdominal -  Soft, nontender, nondistended. Bowel sounds are intact. Extremities - No clubbing, cyanosis, or  edema. Musculoskeletal - No musculoskeletal symptoms. No clubbing . No Osler's nodes. Gait normal .  No kyphosis or scoliosis. Skin -  no statis ulcers or dermatitis. Neurological - No focal signs are identified. Oriented to person, place and time. Psychiatric -  Appropriate affect and mood. Assessment:          Diagnosis Orders   1. Paroxysmal atrial fibrillation (HCC)  EKG 12 lead    MS EXT ECG > 48HR TO 21 DAY RCRD W/CONECT INTL RCRD ([facility] Recording)    ECHO Pharmacological Stress Test   2. Occipital infarction [facility])  ECHO Pharmacological Stress Test   3. Essential hypertension  ECHO Pharmacological Stress Test   4. Hyperlipidemia, unspecified hyperlipidemia type  ECHO Pharmacological Stress Test   5. MCDANIEL (dyspnea on exertion)  ECHO Pharmacological Stress Test   EKG today shows apparent atrial fibrillation no P waves noted. New finding of atrial fibrillation. She has been anticoagulated since discharge with no abnormal bleeding. We discussed avoiding NSAIDs to prevent bleeding, can take Tylenol for arthritis pain    Patient has several risk factors for coronary disease including hypertension, hyperlipidemia, morbid obesity. She has had echo that showed significant diastolic dysfunction with preserved LV function. We will get dobutamine stress echo to evaluate for any ischemia    We will have her wear a monitor for about 5 days to assess A. fib burden.   After testing will discuss options of trying to obtain normal sinus rhythm        Plan  Monitor for 4-6 days to assess for afib burden  Dobutamine stress echo after wearing monitor  For your insurance next year make sure you have a Medicare part D plan to help cover your Eliquis  Follow up in 2 weeks   Call with any questions or concerns  Follow up with PCP for non cardiac problems  Report any new problems  Cardiovascular Fitness-Exercise as tolerated. Strive for 30 minutes of exercise most days of the week. Cardiac / Healthy Diet  Continue current medications as directed  Continue plan of treatment        I appreciate the opportunity of participating in the care and treatment of this patient. LALITA Kohler    EMR dragon/transcription disclaimer: Much of this encounter note is electronic transcription/translation of spoken language to printed tach. Electronic translation of spoken language may be erroneous, or at times, nonsensical words or phrases may be inadvertently transcribed.  Although, I have reviewed the note for such errors, some may still exist.      Cc:  JOHN Whitehead Yes

## 2022-01-01 NOTE — ED ADULT TRIAGE NOTE - CHIEF COMPLAINT QUOTE
I have abd pain started on fri. I have  diarrhea gas pain and vomiting.  I took pepto bismol.  this morning when I went to the bathroom my stool was black
Positive

## 2022-05-07 ENCOUNTER — EMERGENCY (EMERGENCY)
Facility: HOSPITAL | Age: 52
LOS: 1 days | Discharge: HOME | End: 2022-05-07
Attending: EMERGENCY MEDICINE
Payer: MEDICAID

## 2022-05-07 VITALS
WEIGHT: 196.21 LBS | TEMPERATURE: 99 F | RESPIRATION RATE: 16 BRPM | SYSTOLIC BLOOD PRESSURE: 117 MMHG | OXYGEN SATURATION: 97 % | HEART RATE: 72 BPM | DIASTOLIC BLOOD PRESSURE: 73 MMHG | HEIGHT: 61 IN

## 2022-05-07 DIAGNOSIS — Z90.49 ACQUIRED ABSENCE OF OTHER SPECIFIED PARTS OF DIGESTIVE TRACT: Chronic | ICD-10-CM

## 2022-05-07 LAB
ALBUMIN SERPL ELPH-MCNC: 4 G/DL — SIGNIFICANT CHANGE UP (ref 3.3–5.2)
ALP SERPL-CCNC: 86 U/L — SIGNIFICANT CHANGE UP (ref 40–120)
ALT FLD-CCNC: 13 U/L — SIGNIFICANT CHANGE UP
ANION GAP SERPL CALC-SCNC: 9 MMOL/L — SIGNIFICANT CHANGE UP (ref 5–17)
AST SERPL-CCNC: 17 U/L — SIGNIFICANT CHANGE UP
BASOPHILS # BLD AUTO: 0.06 K/UL — SIGNIFICANT CHANGE UP (ref 0–0.2)
BASOPHILS NFR BLD AUTO: 0.9 % — SIGNIFICANT CHANGE UP (ref 0–2)
BILIRUB SERPL-MCNC: <0.2 MG/DL — LOW (ref 0.4–2)
BUN SERPL-MCNC: 12.2 MG/DL — SIGNIFICANT CHANGE UP (ref 8–20)
CALCIUM SERPL-MCNC: 8.8 MG/DL — SIGNIFICANT CHANGE UP (ref 8.6–10.2)
CHLORIDE SERPL-SCNC: 103 MMOL/L — SIGNIFICANT CHANGE UP (ref 98–107)
CO2 SERPL-SCNC: 28 MMOL/L — SIGNIFICANT CHANGE UP (ref 22–29)
CREAT SERPL-MCNC: 0.53 MG/DL — SIGNIFICANT CHANGE UP (ref 0.5–1.3)
D DIMER BLD IA.RAPID-MCNC: 251 NG/ML DDU — HIGH
EGFR: 112 ML/MIN/1.73M2 — SIGNIFICANT CHANGE UP
EOSINOPHIL # BLD AUTO: 0.3 K/UL — SIGNIFICANT CHANGE UP (ref 0–0.5)
EOSINOPHIL NFR BLD AUTO: 4.5 % — SIGNIFICANT CHANGE UP (ref 0–6)
GLUCOSE SERPL-MCNC: 99 MG/DL — SIGNIFICANT CHANGE UP (ref 70–99)
HCG SERPL-ACNC: <4 MIU/ML — SIGNIFICANT CHANGE UP
HCT VFR BLD CALC: 42 % — SIGNIFICANT CHANGE UP (ref 34.5–45)
HGB BLD-MCNC: 13.4 G/DL — SIGNIFICANT CHANGE UP (ref 11.5–15.5)
IMM GRANULOCYTES NFR BLD AUTO: 0.6 % — SIGNIFICANT CHANGE UP (ref 0–1.5)
LYMPHOCYTES # BLD AUTO: 2.63 K/UL — SIGNIFICANT CHANGE UP (ref 1–3.3)
LYMPHOCYTES # BLD AUTO: 39.4 % — SIGNIFICANT CHANGE UP (ref 13–44)
MCHC RBC-ENTMCNC: 31.4 PG — SIGNIFICANT CHANGE UP (ref 27–34)
MCHC RBC-ENTMCNC: 31.9 GM/DL — LOW (ref 32–36)
MCV RBC AUTO: 98.4 FL — SIGNIFICANT CHANGE UP (ref 80–100)
MONOCYTES # BLD AUTO: 0.51 K/UL — SIGNIFICANT CHANGE UP (ref 0–0.9)
MONOCYTES NFR BLD AUTO: 7.6 % — SIGNIFICANT CHANGE UP (ref 2–14)
NEUTROPHILS # BLD AUTO: 3.14 K/UL — SIGNIFICANT CHANGE UP (ref 1.8–7.4)
NEUTROPHILS NFR BLD AUTO: 47 % — SIGNIFICANT CHANGE UP (ref 43–77)
PLATELET # BLD AUTO: 290 K/UL — SIGNIFICANT CHANGE UP (ref 150–400)
POTASSIUM SERPL-MCNC: 3.6 MMOL/L — SIGNIFICANT CHANGE UP (ref 3.5–5.3)
POTASSIUM SERPL-SCNC: 3.6 MMOL/L — SIGNIFICANT CHANGE UP (ref 3.5–5.3)
PROT SERPL-MCNC: 6.8 G/DL — SIGNIFICANT CHANGE UP (ref 6.6–8.7)
RBC # BLD: 4.27 M/UL — SIGNIFICANT CHANGE UP (ref 3.8–5.2)
RBC # FLD: 13.2 % — SIGNIFICANT CHANGE UP (ref 10.3–14.5)
SODIUM SERPL-SCNC: 140 MMOL/L — SIGNIFICANT CHANGE UP (ref 135–145)
TROPONIN T SERPL-MCNC: <0.01 NG/ML — SIGNIFICANT CHANGE UP (ref 0–0.06)
WBC # BLD: 6.68 K/UL — SIGNIFICANT CHANGE UP (ref 3.8–10.5)
WBC # FLD AUTO: 6.68 K/UL — SIGNIFICANT CHANGE UP (ref 3.8–10.5)

## 2022-05-07 PROCEDURE — 99285 EMERGENCY DEPT VISIT HI MDM: CPT | Mod: 25

## 2022-05-07 PROCEDURE — 84484 ASSAY OF TROPONIN QUANT: CPT

## 2022-05-07 PROCEDURE — 80053 COMPREHEN METABOLIC PANEL: CPT

## 2022-05-07 PROCEDURE — 36415 COLL VENOUS BLD VENIPUNCTURE: CPT

## 2022-05-07 PROCEDURE — 85379 FIBRIN DEGRADATION QUANT: CPT

## 2022-05-07 PROCEDURE — 84702 CHORIONIC GONADOTROPIN TEST: CPT

## 2022-05-07 PROCEDURE — 93005 ELECTROCARDIOGRAM TRACING: CPT

## 2022-05-07 PROCEDURE — 99284 EMERGENCY DEPT VISIT MOD MDM: CPT

## 2022-05-07 PROCEDURE — 93010 ELECTROCARDIOGRAM REPORT: CPT

## 2022-05-07 PROCEDURE — 71046 X-RAY EXAM CHEST 2 VIEWS: CPT

## 2022-05-07 PROCEDURE — 71046 X-RAY EXAM CHEST 2 VIEWS: CPT | Mod: 26

## 2022-05-07 PROCEDURE — 85025 COMPLETE CBC W/AUTO DIFF WBC: CPT

## 2022-05-07 NOTE — ED STATDOCS - NSFOLLOWUPCLINICS_GEN_ALL_ED_FT
[facility]  Orthopedics  [address] [place] [id number]  Phone: [phone]  Fax:   Follow Up Time: 7-10 Days

## 2022-05-07 NOTE — ED STATDOCS - CLINICAL SUMMARY MEDICAL DECISION MAKING FREE TEXT BOX
non-specific chest and left arm pain. normal EKG with reproducible pain on examination. Check labs, and chest x-ray. if abnormal, additional imaging.

## 2022-05-07 NOTE — ED STATDOCS - OBJECTIVE STATEMENT
52 y/o female with PMHx of pituitary tumor presents with constant CP and left arm pain for two days. Worse at night, and wakes her up from her sleep. Walking does not intensify the pain, and pt states occasional palpitations. Denies SOB. FHx of heart attack at age 79 for mother. Pt has taken Tylenol arthritis, Advil and Ari with no help.     : Marlin 50 y/o female with PMHx of pituitary tumor presents with constant left upper chest discomfort radiating into left arm down to left hand for the past 2 days.  Hand pain worse at night and wakes her up from her sleep.  denies increased chest or arm pain with exertion.  denies sob/ diff breathing.  reports occasional palpitations.  Has been taking tylenol, advil, aspirin without relief.  FHx of heart attack at age 79 for mother.    : Marlin

## 2022-05-07 NOTE — ED STATDOCS - PATIENT PORTAL LINK FT
You can access the FollowMyHealth Patient Portal offered by [facility] by registering at the following website: [email or web address]. By joining [employer]’s FollowMyHealth portal, you will also be able to view your health information using other applications (apps) compatible with our system.

## 2022-05-07 NOTE — ED STATDOCS - NSFOLLOWUPINSTRUCTIONS_ED_ALL_ED_FT
Tylenol extra strength 2 tablets every 4 hours or Ibuprofen 600mg (3 tablets) every 6 hours as needed for aches, pains. Return immediately to the ER for re-evaluation if your symptoms recur or worsening. Otherwise, follow-up with PMD or orthopedics in 1-2 weeks for reassessment: call today to arrange an appointment.

## 2022-05-07 NOTE — ED STATDOCS - PROGRESS NOTE DETAILS
labs and cxr reviewed: unremarkable.  HEART score 1.   etiology of symptoms unclear.   NSAIDs and follow-up with PMD

## 2022-07-05 NOTE — ED PROVIDER NOTE - CROS ED RESP ALL NEG
Typically for altitude sickness gradual ascent is the best prevention vs any medication     I think with driving they will not have any ascent issues     I am unsure of how an as needed medication for anxiety would work on Imani as we don't have time to do a trial to see how she reacts since they are leaving tomorrow    I think will have to use general distraction techniques   For the future we could trial a medication ahead of time but I am too nervous to prescribe without knowing how she will tolerate it ahead of time                negative...

## 2022-10-17 ENCOUNTER — EMERGENCY (EMERGENCY)
Facility: HOSPITAL | Age: 52
LOS: 1 days | Discharge: HOME | End: 2022-10-17
Attending: EMERGENCY MEDICINE
Payer: MEDICAID

## 2022-10-17 VITALS
WEIGHT: 220.02 LBS | HEIGHT: 61 IN | DIASTOLIC BLOOD PRESSURE: 79 MMHG | SYSTOLIC BLOOD PRESSURE: 133 MMHG | HEART RATE: 66 BPM | RESPIRATION RATE: 20 BRPM | TEMPERATURE: 98 F | OXYGEN SATURATION: 98 %

## 2022-10-17 DIAGNOSIS — Z90.49 ACQUIRED ABSENCE OF OTHER SPECIFIED PARTS OF DIGESTIVE TRACT: Chronic | ICD-10-CM

## 2022-10-17 PROCEDURE — 99283 EMERGENCY DEPT VISIT LOW MDM: CPT

## 2022-10-17 RX ORDER — IBUPROFEN 200 MG
600 TABLET ORAL ONCE
Refills: 0 | Status: COMPLETED | OUTPATIENT
Start: 2022-10-17 | End: 2022-10-17

## 2022-10-17 RX ADMIN — Medication 600 MILLIGRAM(S): at 09:39

## 2022-10-17 NOTE — ED PROVIDER NOTE - OBJECTIVE STATEMENT
52 y f with pmh of pituitary tumor 20 years ago sp surgery and radiation taking hormone replacement presenting for BL hand pain, worse on left. Has been going on for a week but worse since this AM, but has now largely resolved. Is a tingling pain worse in the first 3 fingers. Pt sometimes gets woken up at night by burning pain and numbness in hands. Pt works in electronics using hands and types often. Denies f/c, n/v, ab pain, cp, sob.

## 2022-10-17 NOTE — ED PROVIDER NOTE - CLINICAL SUMMARY MEDICAL DECISION MAKING FREE TEXT BOX
Pt presenting with BL hand pain that is stereotypical for carpel tunnel syndrome. Will control pain.

## 2022-10-17 NOTE — ED PROVIDER NOTE - PATIENT PORTAL LINK FT
You can access the FollowMyHealth Patient Portal offered by [facility] by registering at the following website: [email or web address]. By joining [employer]’s FollowMyHealth portal, you will also be able to view your health information using other applications (apps) compatible with our system.

## 2022-10-17 NOTE — ED PROVIDER NOTE - NSFOLLOWUPINSTRUCTIONS_ED_ALL_ED_FT
Terry un seguimiento con mann proveedor de atención primaria y el cirujano proporcionado.    Vuelva por dolor en el pecho, dificultad para respirar, dolor abdominal, fiebre, escalofríos, náuseas, vómitos.    Síndrome del túnel carpiano    Carpal Tunnel Syndrome       El síndrome del túnel carpiano es earlene afección que causa dolor, adormecimiento y debilidad en la mano y los dedos. El túnel carpiano es un área estrecha ubicada en el lado palmar de la levy. Los movimientos repetidos de la levy o determinadas enfermedades pueden causar la hinchazón del túnel. Esta hinchazón comprime el nervio principal de la levy. El nervio principal de la levy se llama “nervio mediano”.      ¿Cuáles son las causas?    Esta afección puede ser causada por lo siguiente:  •Movimientos repetidos y enérgicos de la levy y la mano.      •Lesiones en la levy.      •Artritis.      •Un quiste o un tumor en el túnel carpiano.      •Acumulación de líquido morgan el embarazo.      •Uso de herramientas que vibran.      A veces, se desconoce la causa de esta afección.      ¿Qué incrementa el riesgo?    Los siguientes factores pueden hacer que sea más propenso a desarrollar esta afección:  •Tener un trabajo que requiera que mueva la levy o la mano repetitivamente o enérgicamente o que utilice herramientas que vibran. Estos pueden ser, entre otros, los trabajos que implican usar earlene computadora, trabajar en earlene línea de ensamblaje o trabajar con herramientas eléctricas lucia taladros y lijadoras.      •Ser dilip.    •Tener ciertas afecciones, tales lucia:  •Diabetes.      •Obesidad.      •Tiroides hipoactiva (hipotiroidismo).      •Insuficiencia renal.      •Artritis reumatoide.          ¿Cuáles son los signos o síntomas?    Los síntomas de esta afección incluyen:  •Sensación de hormigueo en los dedos de la mano, especialmente el pulgar, el índice y el dedo medio.      •Hormigueo o adormecimiento en la mano.      •Sensación de dolor en todo el brazo, especialmente cuando la levy y el codo están flexionados morgan mucho tiempo.      •Dolor en la levy que sube por el brazo hasta el hombro.      •Dolor que baja hasta la christensen de la mano o los dedos.      •Sensación de debilidad en las edgar. Alexx vez tenga dificultad para seema y sostener objetos.      Los síntomas pueden empeorar morgan la noche.      ¿Cómo se diagnostica?    Esta afección se diagnostica mediante los antecedentes médicos y un examen físico. También pueden hacerle estudios, que incluyen los siguientes:  •Un electromiograma (EMG). Esta prueba mide las señales eléctricas que los nervios les envían a los músculos.      •Estudio de conducción nerviosa. Emani estudio permite determinar si las señales eléctricas pasan correctamente por los nervios.      •Estudios de diagnóstico por imágenes, lucia radiografías, earlene ecografía y earlene resonancia magnética (RM). Estos estudios permiten detectar las posibles causas de la afección.        ¿Cómo se trata?    El tratamiento de esta afección puede incluir:  •Cambios en el estilo de vern. Es importante que deje o cambie la actividad que causó la afección.      •Hacer ejercicio y actividades para fortalecer y estirar los músculos y los tendones (fisioterapia).      •Hacer cambios en el estilo de vern que lo ayuden con mann afección y aprender a realizar do actividades diarias de forma varma (terapia ocupacional).      •Analgésicos y antiinflamatorios. [place] puede incluir medicamentos que se inyectan en la levy.      •Earlene férula o un dispositivo ortopédico para la levy.      •Cirugía.        Siga estas instrucciones en mann casa:    Si tiene earlene férula o un dispositivo ortopédico:     •Use la férula o el dispositivo ortopédico lucia se lo haya indicado el médico. Quíteselos solamente lucia se lo haya indicado el médico.      •Afloje la férula o el dispositivo ortopédico si los dedos de las edgar se le adormecen, siente hormigueos o se le enfrían y se tornan de color matthias.      •Mantenga la férula o el dispositivo ortopédico limpios.    •Si la férula o el dispositivo ortopédico no son impermeables:  •No deje que se mojen.      •Cúbralos con un envoltorio hermético cuando tome un baño de inmersión o earlene ducha.          Control del dolor, la rigidez y la hinchazón      Si se lo indican, aplique hielo sobre la kathrine dolorida. Para hacer esto:  •Si tiene earlene férula o un dispositivo ortopédico desmontable, quíteselos lucia se lo haya indicado el médico.      •Ponga el hielo en earlene bolsa plástica.      •Coloque earlene toalla entre la piel y la bolsa, o entre la férula o dispositivo ortopédico y la bolsa.      •Aplique el hielo morgan 20 minutos, 2 o 3 veces por día. No se quede dormido con la bolsa de hielo sobre la piel.      •Retire el hielo si la piel se pone de color de la cruz brillante. [place] es muy importante. Si no puede sentir dolor, calor o frío, tiene un mayor riesgo de que se dañe la kathrine.       Mueva los dedos con frecuencia para reducir la rigidez y la hinchazón.    Instrucciones generales     •Use los medicamentos de venta carlos alberto y los recetados solamente lucia se lo haya indicado el médico.      •Descanse la levy y la mano de toda actividad que le cause dolor. Si la afección tiene relación con el trabajo, hable con mann empleador sobre los cambios que pueden hacerse, por ejemplo, usar earlene almohadilla para apoyar la levy mientras tipea.      •Terry los ejercicios lucia se lo hayan indicado el médico, el fisioterapeuta o el terapeuta ocupacional.      •Cumpla con todas las visitas de seguimiento. [place] es importante.        Comuníquese con un médico si:    •Aparecen nuevos síntomas.      •El dolor no se josé manuel con los medicamentos.      •Do síntomas empeoran.        Solicite ayuda de inmediato si:    •Tiene hormigueo o adormecimiento intensos en la levy o la mano.        Resumen    •El síndrome del túnel carpiano es earlene afección que causa dolor, adormecimiento y debilidad en la mano y los dedos.      •Generalmente se debe a movimientos repetidos de la levy.      •El síndrome del túnel carpiano se trata mediante cambios en el estilo de vern y medicamentos. También puede indicarse la cirugía.      •Siga las instrucciones del médico sobre el uso de earlene férula, el descanso de la actividad, la asistencia a las visitas de seguimiento y llamar para pedir ayuda.      Esta información no tiene lucia fin reemplazar el consejo del médico. Asegúrese de hacerle al médico cualquier pregunta que tenga. Terry un seguimiento con mann proveedor de atención primaria y el cirujano proporcionado.    Amisha ibuprofeno para el dolor.    Vuelva por dolor en el pecho, dificultad para respirar, dolor abdominal, fiebre, escalofríos, náuseas, vómitos.    Síndrome del túnel carpiano    Carpal Tunnel Syndrome       El síndrome del túnel carpiano es earlene afección que causa dolor, adormecimiento y debilidad en la mano y los dedos. El túnel carpiano es un área estrecha ubicada en el lado palmar de la levy. Los movimientos repetidos de la levy o determinadas enfermedades pueden causar la hinchazón del túnel. Esta hinchazón comprime el nervio principal de la levy. El nervio principal de la levy se llama “nervio mediano”.      ¿Cuáles son las causas?    Esta afección puede ser causada por lo siguiente:  •Movimientos repetidos y enérgicos de la levy y la mano.      •Lesiones en la levy.      •Artritis.      •Un quiste o un tumor en el túnel carpiano.      •Acumulación de líquido morgan el embarazo.      •Uso de herramientas que vibran.      A veces, se desconoce la causa de esta afección.      ¿Qué incrementa el riesgo?    Los siguientes factores pueden hacer que sea más propenso a desarrollar esta afección:  •Tener un trabajo que requiera que mueva la levy o la mano repetitivamente o enérgicamente o que utilice herramientas que vibran. Estos pueden ser, entre otros, los trabajos que implican usar earlene computadora, trabajar en earlene línea de ensamblaje o trabajar con herramientas eléctricas lucia taladros y lijadoras.      •Ser dilip.    •Tener ciertas afecciones, tales lucia:  •Diabetes.      •Obesidad.      •Tiroides hipoactiva (hipotiroidismo).      •Insuficiencia renal.      •Artritis reumatoide.          ¿Cuáles son los signos o síntomas?    Los síntomas de esta afección incluyen:  •Sensación de hormigueo en los dedos de la mano, especialmente el pulgar, el índice y el dedo medio.      •Hormigueo o adormecimiento en la mano.      •Sensación de dolor en todo el brazo, especialmente cuando la levy y el codo están flexionados morgan mucho tiempo.      •Dolor en la levy que sube por el brazo hasta el hombro.      •Dolor que baja hasta la christensen de la mano o los dedos.      •Sensación de debilidad en las edgar. Alexx vez tenga dificultad para seema y sostener objetos.      Los síntomas pueden empeorar morgan la noche.      ¿Cómo se diagnostica?    Esta afección se diagnostica mediante los antecedentes médicos y un examen físico. También pueden hacerle estudios, que incluyen los siguientes:  •Un electromiograma (EMG). Esta prueba mide las señales eléctricas que los nervios les envían a los músculos.      •Estudio de conducción nerviosa. Emani estudio permite determinar si las señales eléctricas pasan correctamente por los nervios.      •Estudios de diagnóstico por imágenes, lucia radiografías, earlene ecografía y earlene resonancia magnética (RM). Estos estudios permiten detectar las posibles causas de la afección.        ¿Cómo se trata?    El tratamiento de esta afección puede incluir:  •Cambios en el estilo de vern. Es importante que deje o cambie la actividad que causó la afección.      •Hacer ejercicio y actividades para fortalecer y estirar los músculos y los tendones (fisioterapia).      •Hacer cambios en el estilo de vern que lo ayuden con mann afección y aprender a realizar do actividades diarias de forma varma (terapia ocupacional).      •Analgésicos y antiinflamatorios. [place] puede incluir medicamentos que se inyectan en la levy.      •Earlene férula o un dispositivo ortopédico para la levy.      •Cirugía.        Siga estas instrucciones en mann casa:    Si tiene earlene férula o un dispositivo ortopédico:     •Use la férula o el dispositivo ortopédico lucia se lo haya indicado el médico. Quíteselos solamente lucia se lo haya indicado el médico.      •Afloje la férula o el dispositivo ortopédico si los dedos de las edgar se le adormecen, siente hormigueos o se le enfrían y se tornan de color matthias.      •Mantenga la férula o el dispositivo ortopédico limpios.    •Si la férula o el dispositivo ortopédico no son impermeables:  •No deje que se mojen.      •Cúbralos con un envoltorio hermético cuando tome un baño de inmersión o earlene ducha.          Control del dolor, la rigidez y la hinchazón      Si se lo indican, aplique hielo sobre la kathrine dolorida. Para hacer esto:  •Si tiene earlene férula o un dispositivo ortopédico desmontable, quíteselos lucia se lo haya indicado el médico.      •Ponga el hielo en earlene bolsa plástica.      •Coloque earlene toalla entre la piel y la bolsa, o entre la férula o dispositivo ortopédico y la bolsa.      •Aplique el hielo morgan 20 minutos, 2 o 3 veces por día. No se quede dormido con la bolsa de hielo sobre la piel.      •Retire el hielo si la piel se pone de color de la cruz brillante. [place] es muy importante. Si no puede sentir dolor, calor o frío, tiene un mayor riesgo de que se dañe la kathrine.       Mueva los dedos con frecuencia para reducir la rigidez y la hinchazón.    Instrucciones generales     •Use los medicamentos de venta carlos alberto y los recetados solamente lucia se lo haya indicado el médico.      •Descanse la levy y la mano de toda actividad que le cause dolor. Si la afección tiene relación con el trabajo, hable con mann empleador sobre los cambios que pueden hacerse, por ejemplo, usar earlene almohadilla para apoyar la levy mientras tipea.      •Terry los ejercicios lucia se lo hayan indicado el médico, el fisioterapeuta o el terapeuta ocupacional.      •Cumpla con todas las visitas de seguimiento. [place] es importante.        Comuníquese con un médico si:    •Aparecen nuevos síntomas.      •El dolor no se josé manuel con los medicamentos.      •Do síntomas empeoran.        Solicite ayuda de inmediato si:    •Tiene hormigueo o adormecimiento intensos en la levy o la mano.        Resumen    •El síndrome del túnel carpiano es earlene afección que causa dolor, adormecimiento y debilidad en la mano y los dedos.      •Generalmente se debe a movimientos repetidos de la levy.      •El síndrome del túnel carpiano se trata mediante cambios en el estilo de vern y medicamentos. También puede indicarse la cirugía.      •Siga las instrucciones del médico sobre el uso de earlene férula, el descanso de la actividad, la asistencia a las visitas de seguimiento y llamar para pedir ayuda.      Esta información no tiene lucia fin reemplazar el consejo del médico. Asegúrese de hacerle al médico cualquier pregunta que tenga.

## 2022-10-17 NOTE — ED PROVIDER NOTE - NS ED ROS FT
Constitutional: no fever, no chills  Head: NC, AT   Eyes: no redness   ENMT: no nasal congestion/drainage, no sore throat   CV: no chest pain, no edema  Resp: no cough, no dyspnea  GI: no abdominal pain, no nausea, no vomiting, no diarrhea  : no dysuria, no hematuria   Skin: no lesions, no rashes   Neuro: no LOC, no headache, no sensory deficits, no weakness  MSK: BL hand pain worse on left

## 2022-10-17 NOTE — ED ADULT NURSE NOTE - NSSEPSISNEWALTERMENTAL_ED_A_ED
----- Message from Tomasa Ortiz sent at 2/8/2018  9:31 AM CST -----  Contact: Anali (pt's mother)   Anali called and stated she needed to speak to the nurse. She stated that the pt has pain in both ears and needs to be seen today or tomorrow and is requesting to be squeezed into the schedule as soon as possible. She can be reached at [phone].    Thanks,  Tf      No

## 2022-10-17 NOTE — ED PROVIDER NOTE - CARE PROVIDER_API CALL
Henry Garner)  Orthopaedic Surgery  [address] [place] [address]  Phone: [phone]  Fax: [phone]  Follow Up Time:

## 2022-10-17 NOTE — ED PROVIDER NOTE - PHYSICAL EXAMINATION
General: well appearing, NAD  Head: NC, AT  EENT: EOMI, no scleral icterus  Cardiac: RRR, no apparent murmurs, no lower extremity edema  Respiratory: CTABL, no respiratory distress   Abdomen: soft, ND, NT, nonperitonitic  MSK/Vascular: full ROM, soft compartments, warm extremities  Neuro: AAOx3, sensation to light touch intact, Pain exacerbated by tapping on wrist of left hand and by pressing the back of the hands together.  Psych: calm, cooperative

## 2022-12-02 ENCOUNTER — APPOINTMENT (OUTPATIENT)
Dept: MAMMOGRAPHY | Facility: CLINIC | Age: 52
End: 2022-12-02

## 2022-12-02 ENCOUNTER — OUTPATIENT (OUTPATIENT)
Dept: OUTPATIENT SERVICES | Facility: HOSPITAL | Age: 52
LOS: 1 days | End: 2022-12-02
Payer: COMMERCIAL

## 2022-12-02 DIAGNOSIS — Z90.49 ACQUIRED ABSENCE OF OTHER SPECIFIED PARTS OF DIGESTIVE TRACT: Chronic | ICD-10-CM

## 2022-12-02 DIAGNOSIS — Z12.31 ENCOUNTER FOR SCREENING MAMMOGRAM FOR MALIGNANT NEOPLASM OF BREAST: ICD-10-CM

## 2022-12-02 PROCEDURE — 77063 BREAST TOMOSYNTHESIS BI: CPT

## 2022-12-02 PROCEDURE — 77063 BREAST TOMOSYNTHESIS BI: CPT | Mod: 26

## 2022-12-02 PROCEDURE — 77067 SCR MAMMO BI INCL CAD: CPT | Mod: 26

## 2022-12-02 PROCEDURE — 77067 SCR MAMMO BI INCL CAD: CPT

## 2023-03-10 ENCOUNTER — EMERGENCY (EMERGENCY)
Facility: HOSPITAL | Age: 53
LOS: 1 days | Discharge: HOME | End: 2023-03-10
Attending: STUDENT IN AN ORGANIZED HEALTH CARE EDUCATION/TRAINING PROGRAM
Payer: MEDICAID

## 2023-03-10 VITALS
HEIGHT: 62 IN | SYSTOLIC BLOOD PRESSURE: 138 MMHG | HEART RATE: 67 BPM | DIASTOLIC BLOOD PRESSURE: 99 MMHG | OXYGEN SATURATION: 98 % | WEIGHT: 229.94 LBS | RESPIRATION RATE: 18 BRPM | TEMPERATURE: 98 F

## 2023-03-10 DIAGNOSIS — Z90.49 ACQUIRED ABSENCE OF OTHER SPECIFIED PARTS OF DIGESTIVE TRACT: Chronic | ICD-10-CM

## 2023-03-10 LAB
ALBUMIN SERPL ELPH-MCNC: 3.8 G/DL — SIGNIFICANT CHANGE UP (ref 3.3–5.2)
ALP SERPL-CCNC: 84 U/L — SIGNIFICANT CHANGE UP (ref 40–120)
ALT FLD-CCNC: 11 U/L — SIGNIFICANT CHANGE UP
ANION GAP SERPL CALC-SCNC: 10 MMOL/L — SIGNIFICANT CHANGE UP (ref 5–17)
AST SERPL-CCNC: 18 U/L — SIGNIFICANT CHANGE UP
BASOPHILS # BLD AUTO: 0.09 K/UL — SIGNIFICANT CHANGE UP (ref 0–0.2)
BASOPHILS NFR BLD AUTO: 1.6 % — SIGNIFICANT CHANGE UP (ref 0–2)
BILIRUB SERPL-MCNC: 0.2 MG/DL — LOW (ref 0.4–2)
BUN SERPL-MCNC: 12.4 MG/DL — SIGNIFICANT CHANGE UP (ref 8–20)
CALCIUM SERPL-MCNC: 8.3 MG/DL — LOW (ref 8.4–10.5)
CHLORIDE SERPL-SCNC: 107 MMOL/L — SIGNIFICANT CHANGE UP (ref 96–108)
CO2 SERPL-SCNC: 27 MMOL/L — SIGNIFICANT CHANGE UP (ref 22–29)
CREAT SERPL-MCNC: 0.63 MG/DL — SIGNIFICANT CHANGE UP (ref 0.5–1.3)
EGFR: 107 ML/MIN/1.73M2 — SIGNIFICANT CHANGE UP
EOSINOPHIL # BLD AUTO: 0.25 K/UL — SIGNIFICANT CHANGE UP (ref 0–0.5)
EOSINOPHIL NFR BLD AUTO: 4.4 % — SIGNIFICANT CHANGE UP (ref 0–6)
GLUCOSE SERPL-MCNC: 88 MG/DL — SIGNIFICANT CHANGE UP (ref 70–99)
HCG SERPL-ACNC: <4 MIU/ML — SIGNIFICANT CHANGE UP
HCT VFR BLD CALC: 42.8 % — SIGNIFICANT CHANGE UP (ref 34.5–45)
HGB BLD-MCNC: 13.8 G/DL — SIGNIFICANT CHANGE UP (ref 11.5–15.5)
HIV 1 & 2 AB SERPL IA.RAPID: SIGNIFICANT CHANGE UP
IMM GRANULOCYTES NFR BLD AUTO: 0.5 % — SIGNIFICANT CHANGE UP (ref 0–0.9)
LIDOCAIN IGE QN: 35 U/L — SIGNIFICANT CHANGE UP (ref 22–51)
LYMPHOCYTES # BLD AUTO: 2.96 K/UL — SIGNIFICANT CHANGE UP (ref 1–3.3)
LYMPHOCYTES # BLD AUTO: 51.6 % — HIGH (ref 13–44)
MCHC RBC-ENTMCNC: 31.5 PG — SIGNIFICANT CHANGE UP (ref 27–34)
MCHC RBC-ENTMCNC: 32.2 GM/DL — SIGNIFICANT CHANGE UP (ref 32–36)
MCV RBC AUTO: 97.7 FL — SIGNIFICANT CHANGE UP (ref 80–100)
MONOCYTES # BLD AUTO: 0.4 K/UL — SIGNIFICANT CHANGE UP (ref 0–0.9)
MONOCYTES NFR BLD AUTO: 7 % — SIGNIFICANT CHANGE UP (ref 2–14)
NEUTROPHILS # BLD AUTO: 2.01 K/UL — SIGNIFICANT CHANGE UP (ref 1.8–7.4)
NEUTROPHILS NFR BLD AUTO: 34.9 % — LOW (ref 43–77)
PLATELET # BLD AUTO: 308 K/UL — SIGNIFICANT CHANGE UP (ref 150–400)
POTASSIUM SERPL-MCNC: 4 MMOL/L — SIGNIFICANT CHANGE UP (ref 3.5–5.3)
POTASSIUM SERPL-SCNC: 4 MMOL/L — SIGNIFICANT CHANGE UP (ref 3.5–5.3)
PROT SERPL-MCNC: 6.3 G/DL — LOW (ref 6.6–8.7)
RBC # BLD: 4.38 M/UL — SIGNIFICANT CHANGE UP (ref 3.8–5.2)
RBC # FLD: 12.9 % — SIGNIFICANT CHANGE UP (ref 10.3–14.5)
SODIUM SERPL-SCNC: 143 MMOL/L — SIGNIFICANT CHANGE UP (ref 135–145)
WBC # BLD: 5.74 K/UL — SIGNIFICANT CHANGE UP (ref 3.8–10.5)
WBC # FLD AUTO: 5.74 K/UL — SIGNIFICANT CHANGE UP (ref 3.8–10.5)

## 2023-03-10 PROCEDURE — 99284 EMERGENCY DEPT VISIT MOD MDM: CPT

## 2023-03-10 PROCEDURE — 96375 TX/PRO/DX INJ NEW DRUG ADDON: CPT

## 2023-03-10 PROCEDURE — 85025 COMPLETE CBC W/AUTO DIFF WBC: CPT

## 2023-03-10 PROCEDURE — 83690 ASSAY OF LIPASE: CPT

## 2023-03-10 PROCEDURE — 96374 THER/PROPH/DIAG INJ IV PUSH: CPT | Mod: XU

## 2023-03-10 PROCEDURE — 74177 CT ABD & PELVIS W/CONTRAST: CPT | Mod: 26,MD

## 2023-03-10 PROCEDURE — 99284 EMERGENCY DEPT VISIT MOD MDM: CPT | Mod: 25

## 2023-03-10 PROCEDURE — 74177 CT ABD & PELVIS W/CONTRAST: CPT | Mod: MD

## 2023-03-10 PROCEDURE — 86703 HIV-1/HIV-2 1 RESULT ANTBDY: CPT

## 2023-03-10 PROCEDURE — 36415 COLL VENOUS BLD VENIPUNCTURE: CPT

## 2023-03-10 PROCEDURE — 80053 COMPREHEN METABOLIC PANEL: CPT

## 2023-03-10 PROCEDURE — 84702 CHORIONIC GONADOTROPIN TEST: CPT

## 2023-03-10 RX ORDER — KETOROLAC TROMETHAMINE 30 MG/ML
15 SYRINGE (ML) INJECTION ONCE
Refills: 0 | Status: DISCONTINUED | OUTPATIENT
Start: 2023-03-10 | End: 2023-03-10

## 2023-03-10 RX ORDER — ONDANSETRON 8 MG/1
4 TABLET, FILM COATED ORAL ONCE
Refills: 0 | Status: COMPLETED | OUTPATIENT
Start: 2023-03-10 | End: 2023-03-10

## 2023-03-10 RX ADMIN — ONDANSETRON 4 MILLIGRAM(S): 8 TABLET, FILM COATED ORAL at 08:47

## 2023-03-10 RX ADMIN — Medication 15 MILLIGRAM(S): at 08:47

## 2023-03-10 NOTE — ED STATDOCS - NSFOLLOWUPINSTRUCTIONS_ED_ALL_ED_FT
Epiploic Appendagitis    WHAT YOU NEED TO KNOW:    What is epiploic appendagitis? Epiploic appendagitis is a condition that causes severe abdominal pain. The condition develops when blood cannot flow to small pouches of fat on your large intestine.  Abdominal Organs         What increases my risk for epiploic appendagitis?   •Being male      •Obesity      •Strenuous exercise      •An abdominal hernia      •A condition such as appendicitis (inflamed appendix) or diverticulitis (inflamed pockets in the colon)      What are the signs and symptoms of epiploic appendagitis?   •Pain, usually on the lower left side of the abdomen that may come and go      •Pain that is worse when you cough or breathe deeply      •An area of the abdomen that is tender to the touch      •Rarely, fever, nausea, vomiting, diarrhea, or loss of appetite      What are the types of epiploic appendagitis?   •Primary means the blood supply to the pouches is cut off. This may be from a twisted area that pinches off blood vessels. Blood vessels may instead collapse or get a blood clot. These block the blood flow to the pouches.      •Secondary means the colon or tissues around it are infected. Common causes include appendicitis or diverticulitis. Inflammation changes the blood flow and causes pain.      How is epiploic appendagitis diagnosed? Your healthcare provider will examine you and ask about your symptoms. Tell him or her when symptoms began. Describe anything that makes your symptoms better or worse. Your provider may use certain tests to rule out other conditions, such as appendicitis or diverticulitis. You may need any of the following:  •Blood tests may be used to rule out other causes of your pain.      •CT scan pictures will show certain signs, such as a thicker bowel wall than normal. Contrast liquid may be used to help the area show up better in pictures. Tell the healthcare provider if you have ever had an allergic reaction to contrast liquid.      How is epiploic appendagitis treated? Epiploic appendagitis usually goes away without treatment. You may need any of the following if your pain continues:  •Medicines may be given to treat pain or a bacterial infection.      •Surgery may be needed to treat certain causes. You may need to have your appendix removed, for example. Surgery may also be needed if your symptoms become severe, continue, or happen often.      What can I do to manage or prevent epiploic appendagitis?   •Manage your weight. Ask your healthcare provider what a healthy weight is for you. He or she can help you create a safe weight-loss plan, if needed.      •Eat a variety of healthy foods. Healthy foods include vegetables, fruit, low-fat dairy products, lean meats, fish, whole-grain breads and cereals, nuts, and cooked beans.  Healthy Foods           •Use portion control for food. Eat several small meals during the day instead of 3 large meals. Try not to eat large amounts of food at 1 time. Your healthcare provider or a dietitian can help you create healthy meal plans, if needed. He or she can help you understand the correct portion sizes for each food.  Portion Sizes for Adults           •Be active throughout the day. Physical activity, such as exercise, can help you manage your weight. Physical activity can also help digestion. Your healthcare provider can help you create a physical activity plan. Try to stay active during the day. Even a few minutes of physical activity several times during the day will help.  Ways to Be Physically Active           When should I call my doctor?   •You have a fever.      •Your pain continues longer than 1 week.      •You are vomiting and cannot keep food down.      •You have chills, a cough, or feel weak and achy.      •You have trouble having a bowel movement, or you have diarrhea.      •You have questions or concerns about your condition or care.      CARE AGREEMENT:    You have the right to help plan your care. Learn about your health condition and how it may be treated. Discuss treatment options with your healthcare providers to decide what care you want to receive. You always have the right to refuse treatment                            Abdominal Pain, Adult      Pain in the abdomen (abdominal pain) can be caused by many things. Often, abdominal pain is not serious and it gets better with no treatment or by being treated at home. However, sometimes abdominal pain is serious.    Your health care provider will ask questions about your medical history and do a physical exam to try to determine the cause of your abdominal pain.      Follow these instructions at home:    Medicines     •Take over-the-counter and prescription medicines only as told by your health care provider.      • Do not take a laxative unless told by your health care provider.        General instructions      •Watch your condition for any changes.      •Drink enough fluid to keep your urine pale yellow.      •Keep all follow-up visits as told by your health care provider. This is important.        Contact a health care provider if:    •Your abdominal pain changes or gets worse.      •You are not hungry or you lose weight without trying.      •You are constipated or have diarrhea for more than 2–3 days.      •You have pain when you urinate or have a bowel movement.      •Your abdominal pain wakes you up at night.      •Your pain gets worse with meals, after eating, or with certain foods.      •You are vomiting and cannot keep anything down.      •You have a fever.      •You have blood in your urine.        Get help right away if:    •Your pain does not go away as soon as your health care provider told you to expect.      •You cannot stop vomiting.      •Your pain is only in areas of the abdomen, such as the right side or the left lower portion of the abdomen. Pain on the right side could be caused by appendicitis.      •You have bloody or black stools, or stools that look like tar.      •You have severe pain, cramping, or bloating in your abdomen.    •You have signs of dehydration, such as:  •Dark urine, very little urine, or no urine.      •Cracked lips.      •Dry mouth.      •Sunken eyes.      •Sleepiness.      •Weakness.        •You have trouble breathing or chest pain.        Summary    •Often, abdominal pain is not serious and it gets better with no treatment or by being treated at home. However, sometimes abdominal pain is serious.      •Watch your condition for any changes.      •Take over-the-counter and prescription medicines only as told by your health care provider.      •Contact a health care provider if your abdominal pain changes or gets worse.      •Get help right away if you have severe pain, cramping, or bloating in your abdomen.

## 2023-03-10 NOTE — ED STATDOCS - PHYSICAL EXAMINATION
Vital Signs per nursing documentation  Gen: well appearing, no acute distress  HEENT: NCAT, MMM  Cardiac: regular rate rhythm, normal S1S2  Chest: clear to auscultation bilateral, no wheezes or crackles  Abdomen: soft, non distended, + LLQ tenderness  Extremity: no gross deformity, good perfusion  Skin: no rash  Neuro: nonfocal neuro exam, gait steady

## 2023-03-10 NOTE — ED ADULT NURSE NOTE - AS PAIN REST
HEARING AID CONSULTATION    Hearing test results: Bilateral sensorineural hearing loss. Medical clearance: Dr. Neelima Graves. Insurance: [employer] through [employer]:   COAT (Characteristics of Amplification Tool):   Patient does not feel that he has any trouble hearing aid is not motivated to wear hearing aids at this time. His family would like him to pursue hearing aids especially given that he has been diagnosed with Dementia. Patient's wife does indicate that Patient spends the majority of the day sleeping. 1. Restaurants   2. Synagogue  3. Family gatherings   4. Television      PATIENT SPECIFIC CONSIDERATIONS:   Prior knowledge of amplification: None  Patient has Dementia and Parkinson's and would therefore need assistance with the hearing aid from his wife. SUMMARY OF DISCUSSION:    Styles, features, colors, levels of technology, pricing, trial period, warranty, batteries, life of hearing aid and batteries, upkeep supplies, realistic expectations    TREATMENT PLAN:   Hearing aid parameters:        Style: In the ear       Ear(s): both         Technology level:  [address]        Hearing aid color: Beige        Earmold impression: To be taken if Patient decides to pursue hearing aids. Volume control       Telecoil       Directional       Multiple memory  /Model: [employer] T 56 W 13 ITE  Comments: Patient and his wife were counseled about the importance of wearing hearing aids especially with the diagnosis of Dementia. They were provided handouts on information discussed at today's appointment as well of a copy of the most recent hearing test.      FOLLOW UP: Patient will call if he decides to pursue hearing aids through [facility]. 5 (moderate pain)

## 2023-03-10 NOTE — ED STATDOCS - PROGRESS NOTE DETAILS
reviewed ct results pt to follow up with GI doctor outpatient   abd soft nontender pt with improvement in symptoms

## 2023-03-10 NOTE — ED STATDOCS - CLINICAL SUMMARY MEDICAL DECISION MAKING FREE TEXT BOX
53 y/o female with PMHx of pituitary tumor s/p resection, hypothyroidism presents to the ED c/o 1 week of LLQ abdominal pain radiating into her back associated with nausea. Pt denies fevers, chills, vomiting, diarrhea, vaginal symptoms, black or blood stool. States tried to get appointment with PMD but was unable to get one until next month. PSHx of cholecystectomy. On physical exam pt with LLQ tenderness. Pt well appearing with LLQ tenderness, will check labs, CT to evaluate for diverticulitis, treat supportively, reassess.

## 2023-03-10 NOTE — ED STATDOCS - OBJECTIVE STATEMENT
53 y/o female with PMHx of pituitary tumor s/p resection, hypothyroidism presents to the ED c/o 1 week of LLQ abdominal pain radiating into her back associated with nausea. Pt denies fevers, chills, vomiting, diarrhea, vaginal symptoms, black or blood stool. States tried to get appointment with PMD but was unable to get one until next month. PSHx of cholecystectomy

## 2023-03-10 NOTE — ED STATDOCS - ATTENDING APP SHARED VISIT CONTRIBUTION OF CARE
I, Shira Coronel, performed a face to face bedside interview with this patient regarding history of present illness, review of symptoms and relevant past medical, social and family history.  I completed an independent physical examination. Medical decision making, follow-up on ordered tests (ie labs, radiologic studies) and re-evaluation of the patient's status has been communicated to the ACP.  Disposition of the patient will be based on test outcome and response to ED interventions.

## 2023-03-10 NOTE — ED STATDOCS - PATIENT PORTAL LINK FT
You can access the FollowMyHealth Patient Portal offered by [facility] by registering at the following website: [email or web address]. By joining [employer]’s FollowMyHealth portal, you will also be able to view your health information using other applications (apps) compatible with our system.

## 2023-03-10 NOTE — ED STATDOCS - NS ED ATTENDING STATEMENT MOD
This was a shared visit with the TRAVON. I reviewed and verified the documentation and independently performed the documented:

## 2023-11-07 NOTE — ED ADULT NURSE NOTE - RADIATION
Occupational Therapy   Treatment    Name: Holly Porter  MRN: [id number]  Admitting Diagnosis:  Acute on chronic respiratory failure with hypoxia and hypercapnia  1 Day Post-Op    Recommendations:     Discharge Recommendations:    Discharge Equipment Recommendations:     Barriers to discharge:       Assessment:     Holly Porter is a 64 y.o. female with a medical diagnosis of Acute on chronic respiratory failure with hypoxia and hypercapnia.  She presents with the following performance deficits affecting function are weakness, impaired endurance, impaired self care skills, impaired functional mobility, gait instability, impaired balance, impaired cardiopulmonary response to activity, decreased coordination.     Rehab Prognosis:  Good; patient would benefit from acute skilled OT services to address these deficits and reach maximum level of function.       Plan:     Patient to be seen 5 x/week to address the above listed problems via self-care/home management, therapeutic activities, therapeutic exercises  Plan of Care Expires:    Plan of Care Reviewed with: patient    Subjective     Chief Complaint: acute on chronic respiratory failure  Patient/Family Comments/goals: Agreeable to OT tx, motivated to walk  Pain/Comfort:  Pain Rating 1: 0/10    Objective:     Communicated with: PRETTY Robert RN prior to session.  Patient found supine with peripheral IV, oxygen upon OT entry to room.    General Precautions: Standard, fall    Orthopedic Precautions:   Braces:    Respiratory Status: Nasal cannula, flow 4 L/min     Occupational Performance:     Bed Mobility:    Patient completed Supine to Sit with minimum assistance     Functional Mobility/Transfers:  Patient completed Sit <> Stand Transfer with minimum assistance  with  rolling walker   Functional Mobility: Patient ambulated within room and hallway with SBA-CGA with RW    Activities of Daily Living:        AMPAC 6 Click ADL: 18    Treatment & Education:  Pt  performed B UE strengthening exercises to include:   Shoulder flexion   Chest press    Bicep curls  All exercises performed 2x20 reps with 2# weight.      Patient left up in chair with all lines intact, call button in reach, and RN notified    GOALS:   Multidisciplinary Problems       Occupational Therapy Goals          Problem: Occupational Therapy    Goal Priority Disciplines Outcome Interventions   Occupational Therapy Goal     OT, PT/OT Ongoing, Progressing    Description: STG:  Pt will perform grooming with setup  Pt will bathe with Mod I  Pt will perform UE dressing with I  Pt will perform LE dressing with Mod I  Pt will sit EOB x 15 min with supervision assistance  Pt will transfer bed/chair/bsc with Mod I  Pt will perform standing task x 15 min with supervision assistance  Pt will tolerate 30 minutes of tx without fatigue      LT.Restore to max I with self care and mobility.                          Time Tracking:     OT Date of Treatment: 23  OT Start Time: 1121  OT Stop Time: 1157  OT Total Time (min): 36 min    Billable Minutes:Therapeutic Activity 19  Therapeutic Exercise 10               2023   back/leg

## 2024-06-06 ENCOUNTER — EMERGENCY (EMERGENCY)
Facility: HOSPITAL | Age: 54
LOS: 1 days | Discharge: HOME | End: 2024-06-06
Attending: STUDENT IN AN ORGANIZED HEALTH CARE EDUCATION/TRAINING PROGRAM
Payer: COMMERCIAL

## 2024-06-06 VITALS
TEMPERATURE: 100 F | DIASTOLIC BLOOD PRESSURE: 96 MMHG | OXYGEN SATURATION: 96 % | SYSTOLIC BLOOD PRESSURE: 132 MMHG | HEIGHT: 62 IN | RESPIRATION RATE: 20 BRPM | HEART RATE: 95 BPM | WEIGHT: 215.83 LBS

## 2024-06-06 DIAGNOSIS — Z90.49 ACQUIRED ABSENCE OF OTHER SPECIFIED PARTS OF DIGESTIVE TRACT: Chronic | ICD-10-CM

## 2024-06-06 LAB
ALBUMIN SERPL ELPH-MCNC: 3.6 G/DL — SIGNIFICANT CHANGE UP (ref 3.3–5.2)
ALP SERPL-CCNC: 72 U/L — SIGNIFICANT CHANGE UP (ref 40–120)
ALT FLD-CCNC: 18 U/L — SIGNIFICANT CHANGE UP
ANION GAP SERPL CALC-SCNC: 11 MMOL/L — SIGNIFICANT CHANGE UP (ref 5–17)
AST SERPL-CCNC: 31 U/L — SIGNIFICANT CHANGE UP
BASOPHILS # BLD AUTO: 0 K/UL — SIGNIFICANT CHANGE UP (ref 0–0.2)
BASOPHILS NFR BLD AUTO: 0 % — SIGNIFICANT CHANGE UP (ref 0–2)
BILIRUB SERPL-MCNC: 0.3 MG/DL — LOW (ref 0.4–2)
BUN SERPL-MCNC: 6.7 MG/DL — LOW (ref 8–20)
CALCIUM SERPL-MCNC: 8.2 MG/DL — LOW (ref 8.4–10.5)
CHLORIDE SERPL-SCNC: 99 MMOL/L — SIGNIFICANT CHANGE UP (ref 96–108)
CO2 SERPL-SCNC: 26 MMOL/L — SIGNIFICANT CHANGE UP (ref 22–29)
CREAT SERPL-MCNC: 0.76 MG/DL — SIGNIFICANT CHANGE UP (ref 0.5–1.3)
EGFR: 94 ML/MIN/1.73M2 — SIGNIFICANT CHANGE UP
EOSINOPHIL # BLD AUTO: 0.11 K/UL — SIGNIFICANT CHANGE UP (ref 0–0.5)
EOSINOPHIL NFR BLD AUTO: 3.6 % — SIGNIFICANT CHANGE UP (ref 0–6)
GIANT PLATELETS BLD QL SMEAR: PRESENT — SIGNIFICANT CHANGE UP
GLUCOSE SERPL-MCNC: 95 MG/DL — SIGNIFICANT CHANGE UP (ref 70–99)
HCG SERPL-ACNC: <4 MIU/ML — SIGNIFICANT CHANGE UP
HCT VFR BLD CALC: 41.8 % — SIGNIFICANT CHANGE UP (ref 34.5–45)
HGB BLD-MCNC: 13.8 G/DL — SIGNIFICANT CHANGE UP (ref 11.5–15.5)
LIDOCAIN IGE QN: 29 U/L — SIGNIFICANT CHANGE UP (ref 22–51)
LYMPHOCYTES # BLD AUTO: 0.43 K/UL — LOW (ref 1–3.3)
LYMPHOCYTES # BLD AUTO: 14.3 % — SIGNIFICANT CHANGE UP (ref 13–44)
MAGNESIUM SERPL-MCNC: 2 MG/DL — SIGNIFICANT CHANGE UP (ref 1.6–2.6)
MANUAL SMEAR VERIFICATION: SIGNIFICANT CHANGE UP
MCHC RBC-ENTMCNC: 31.1 PG — SIGNIFICANT CHANGE UP (ref 27–34)
MCHC RBC-ENTMCNC: 33 GM/DL — SIGNIFICANT CHANGE UP (ref 32–36)
MCV RBC AUTO: 94.1 FL — SIGNIFICANT CHANGE UP (ref 80–100)
MONOCYTES # BLD AUTO: 0.24 K/UL — SIGNIFICANT CHANGE UP (ref 0–0.9)
MONOCYTES NFR BLD AUTO: 8 % — SIGNIFICANT CHANGE UP (ref 2–14)
NEUTROPHILS # BLD AUTO: 2.05 K/UL — SIGNIFICANT CHANGE UP (ref 1.8–7.4)
NEUTROPHILS NFR BLD AUTO: 68.7 % — SIGNIFICANT CHANGE UP (ref 43–77)
PLAT MORPH BLD: NORMAL — SIGNIFICANT CHANGE UP
PLATELET # BLD AUTO: 203 K/UL — SIGNIFICANT CHANGE UP (ref 150–400)
POTASSIUM SERPL-MCNC: 3.9 MMOL/L — SIGNIFICANT CHANGE UP (ref 3.5–5.3)
POTASSIUM SERPL-SCNC: 3.9 MMOL/L — SIGNIFICANT CHANGE UP (ref 3.5–5.3)
PROT SERPL-MCNC: 6.2 G/DL — LOW (ref 6.6–8.7)
RBC # BLD: 4.44 M/UL — SIGNIFICANT CHANGE UP (ref 3.8–5.2)
RBC # FLD: 13.4 % — SIGNIFICANT CHANGE UP (ref 10.3–14.5)
RBC BLD AUTO: NORMAL — SIGNIFICANT CHANGE UP
SMUDGE CELLS # BLD: PRESENT — SIGNIFICANT CHANGE UP
SODIUM SERPL-SCNC: 136 MMOL/L — SIGNIFICANT CHANGE UP (ref 135–145)
VARIANT LYMPHS # BLD: 5.4 % — SIGNIFICANT CHANGE UP (ref 0–6)
WBC # BLD: 2.99 K/UL — LOW (ref 3.8–10.5)
WBC # FLD AUTO: 2.99 K/UL — LOW (ref 3.8–10.5)

## 2024-06-06 PROCEDURE — 80053 COMPREHEN METABOLIC PANEL: CPT

## 2024-06-06 PROCEDURE — 93005 ELECTROCARDIOGRAM TRACING: CPT

## 2024-06-06 PROCEDURE — 83690 ASSAY OF LIPASE: CPT

## 2024-06-06 PROCEDURE — 74177 CT ABD & PELVIS W/CONTRAST: CPT | Mod: MC

## 2024-06-06 PROCEDURE — 36415 COLL VENOUS BLD VENIPUNCTURE: CPT

## 2024-06-06 PROCEDURE — 84702 CHORIONIC GONADOTROPIN TEST: CPT

## 2024-06-06 PROCEDURE — 93010 ELECTROCARDIOGRAM REPORT: CPT

## 2024-06-06 PROCEDURE — 96375 TX/PRO/DX INJ NEW DRUG ADDON: CPT

## 2024-06-06 PROCEDURE — 83735 ASSAY OF MAGNESIUM: CPT

## 2024-06-06 PROCEDURE — 96374 THER/PROPH/DIAG INJ IV PUSH: CPT | Mod: XU

## 2024-06-06 PROCEDURE — 85025 COMPLETE CBC W/AUTO DIFF WBC: CPT

## 2024-06-06 PROCEDURE — 99285 EMERGENCY DEPT VISIT HI MDM: CPT

## 2024-06-06 PROCEDURE — 99285 EMERGENCY DEPT VISIT HI MDM: CPT | Mod: 25

## 2024-06-06 PROCEDURE — 74177 CT ABD & PELVIS W/CONTRAST: CPT | Mod: 26,MC

## 2024-06-06 RX ORDER — PIPERACILLIN AND TAZOBACTAM 4; .5 G/20ML; G/20ML
3.38 INJECTION, POWDER, LYOPHILIZED, FOR SOLUTION INTRAVENOUS ONCE
Refills: 0 | Status: COMPLETED | OUTPATIENT
Start: 2024-06-06 | End: 2024-06-06

## 2024-06-06 RX ORDER — ACETAMINOPHEN 500 MG
1000 TABLET ORAL ONCE
Refills: 0 | Status: COMPLETED | OUTPATIENT
Start: 2024-06-06 | End: 2024-06-06

## 2024-06-06 RX ORDER — ONDANSETRON 8 MG/1
4 TABLET, FILM COATED ORAL ONCE
Refills: 0 | Status: COMPLETED | OUTPATIENT
Start: 2024-06-06 | End: 2024-06-06

## 2024-06-06 RX ORDER — DIPHENHYDRAMINE HCL 50 MG
25 CAPSULE ORAL ONCE
Refills: 0 | Status: COMPLETED | OUTPATIENT
Start: 2024-06-06 | End: 2024-06-06

## 2024-06-06 RX ORDER — SODIUM CHLORIDE 9 MG/ML
1000 INJECTION, SOLUTION INTRAVENOUS ONCE
Refills: 0 | Status: COMPLETED | OUTPATIENT
Start: 2024-06-06 | End: 2024-06-06

## 2024-06-06 RX ADMIN — PIPERACILLIN AND TAZOBACTAM 200 GRAM(S): 4; .5 INJECTION, POWDER, LYOPHILIZED, FOR SOLUTION INTRAVENOUS at 12:21

## 2024-06-06 RX ADMIN — ONDANSETRON 4 MILLIGRAM(S): 8 TABLET, FILM COATED ORAL at 08:50

## 2024-06-06 RX ADMIN — SODIUM CHLORIDE 1000 MILLILITER(S): 9 INJECTION, SOLUTION INTRAVENOUS at 10:19

## 2024-06-06 RX ADMIN — Medication 400 MILLIGRAM(S): at 08:50

## 2024-06-06 RX ADMIN — Medication 25 MILLIGRAM(S): at 12:21

## 2024-06-06 NOTE — ED ADULT NURSE NOTE - TEMPLATE LIST FOR HEAD TO TOE ASSESSMENT
----- Message from Rudy Medina MD sent at 1/1/2020  1:20 PM CST -----  Labs ok     Abdominal Pain, N/V/D

## 2024-06-06 NOTE — ED STATDOCS - PATIENT PORTAL LINK FT
You can access the FollowMyHealth Patient Portal offered by [facility] by registering at the following website: [email or web address]. By joining [employer]’s FollowMyHealth portal, you will also be able to view your health information using other applications (apps) compatible with our system.

## 2024-06-06 NOTE — ED STATDOCS - OBJECTIVE STATEMENT
Patient with a past medical history of prior brain tumor on prednisone, hypothyroidism is presenting with abdominal pain.  States 2 days ago she had Macias's, that since then she has been having diffuse abdominal pain with nausea but no vomiting.  No fevers.  Denies any chest pain or shortness of breath.  States had prior cholecystectomy.  Has not tried any medication for the symptoms.  Was unable to eat yesterday due to the pain.

## 2024-06-06 NOTE — ED STATDOCS - CARE PROVIDER_API CALL
Marco Montoya  Gastroenterology  [address], [address]  [place] [address]  Phone: [phone]  Fax: [phone]  Follow Up Time:     Bjorn Locke  Allergy and Immunology  [address] [place] [id number]  Phone: [phone]  Fax: [phone]  Follow Up Time:

## 2024-06-06 NOTE — ED ADULT NURSE NOTE - OBJECTIVE STATEMENT
Aox4. Pt presenting to Emergency Department complaining of abd pain and nausea x 2 days. Pt reports pain started after eating Macias's 2 days ago. Denies chest pain, SOB, back pain, headaches, dizziness, lightheadedness, fevers, chills, , vomiting, diarrhea, constipation and dysuria. RR even and unlabored. Skin warm to touch.

## 2024-06-06 NOTE — ED STATDOCS - CLINICAL SUMMARY MEDICAL DECISION MAKING FREE TEXT BOX
Patient here with postprandial pain, however has had prior history of cholecystectomy.  Will check labs to assess for pancreatitis.  With diffuse abdominal pain, will check CT scan to evaluate for intra-abdominal pathology such as appendicitis, diverticulitis.  History and exam is not consistent with renal stone.  Will check screening EKG for atypical ACS though this is less likely without chest pain or shortness of breath.  Plan on lab work, imaging, medications and reevaluate. Patient here with postprandial pain, however has had prior history of cholecystectomy.  Will check labs to assess for pancreatitis.  With diffuse abdominal pain, will check CT scan to evaluate for intra-abdominal pathology such as appendicitis, diverticulitis.  History and exam is not consistent with renal stone.  Will check screening EKG for atypical ACS though this is less likely without chest pain or shortness of breath.  Plan on lab work, imaging, medications and reevaluate.    Pt with ct showing diverticulitis, will treat with augmentin, pt with low wbc could be related to infection, advised to f/u with pcp after duration of treatment to recheck wbc, pt with itching and rash, no known source of reaction, will give benadryl f/u with allergist, Pt reassessed, pt feeling better at this time, vss, pt able to walk, talk and vocalized plan of action. Discussed in depth and explained to pt in depth the next steps that need to be taking including proper follow up with PCP or specialists. All incidental findings were discussed with pt as well. Pt verbalized their concerns and all questions were answered. Pt understands dispo and wants discharge. Given good instructions when to return to ED and importance of f/u.

## 2024-06-06 NOTE — ED STATDOCS - ATTENDING APP SHARED VISIT CONTRIBUTION OF CARE
I, Alejandro Gama, performed the initial face to face bedside interview with this patient regarding history of present illness, review of symptoms and relevant past medical, social and family history.  I completed an independent physical examination.  I was the initial provider who evaluated this patient. I have signed out the follow up of any pending tests (i.e. labs, radiological studies) to the ACP.  I have communicated the patient’s plan of care and disposition with the ACP.

## 2024-06-06 NOTE — ED ADULT TRIAGE NOTE - CHIEF COMPLAINT QUOTE
Pt A&Ox4, NAD. Pt presents to the ED with complaints of abdominal pain x1 day. Pt also with complaints of rash to hand.

## 2024-06-06 NOTE — ED STATDOCS - PHYSICAL EXAMINATION
Constitutional: Awake, Alert, non-toxic. No acute distress.  HEAD: Normocephalic, atraumatic.   EYES: PERRL, EOM intact, conjunctiva and sclera are clear bilaterally.  ENT: External ears normal. No rhinorrhea, no tracheal deviation   NECK: Supple, non-tender  CARDIOVASCULAR: regular rate and rhythm.  RESPIRATORY: Normal respiratory effort; breath sounds CTAB, no wheezes, rhonchi, or rales. Speaking in full sentences. No accessory muscle use.   ABDOMEN: Soft; diffuse abdominal pain though worse on right side, non-distended. No rebound or guarding.   MSK:  no lower extremity edema, no deformities  SKIN: Warm, dry  NEURO: A&O x3. Sensory and motor functions are grossly intact. Speech is normal. No facial droop.  PSYCH: Appearance and judgement seem appropriate for gender and age.

## 2024-06-08 ENCOUNTER — EMERGENCY (EMERGENCY)
Facility: HOSPITAL | Age: 54
LOS: 1 days | Discharge: HOME | End: 2024-06-08
Attending: EMERGENCY MEDICINE
Payer: COMMERCIAL

## 2024-06-08 VITALS
SYSTOLIC BLOOD PRESSURE: 109 MMHG | RESPIRATION RATE: 19 BRPM | OXYGEN SATURATION: 98 % | TEMPERATURE: 98 F | DIASTOLIC BLOOD PRESSURE: 63 MMHG | HEART RATE: 69 BPM

## 2024-06-08 VITALS
HEART RATE: 84 BPM | OXYGEN SATURATION: 97 % | WEIGHT: 213.41 LBS | RESPIRATION RATE: 20 BRPM | TEMPERATURE: 99 F | HEIGHT: 62 IN | DIASTOLIC BLOOD PRESSURE: 72 MMHG | SYSTOLIC BLOOD PRESSURE: 97 MMHG

## 2024-06-08 DIAGNOSIS — Z90.49 ACQUIRED ABSENCE OF OTHER SPECIFIED PARTS OF DIGESTIVE TRACT: Chronic | ICD-10-CM

## 2024-06-08 LAB
ANION GAP SERPL CALC-SCNC: 11 MMOL/L — SIGNIFICANT CHANGE UP (ref 5–17)
BASOPHILS # BLD AUTO: 0.03 K/UL — SIGNIFICANT CHANGE UP (ref 0–0.2)
BASOPHILS NFR BLD AUTO: 0.7 % — SIGNIFICANT CHANGE UP (ref 0–2)
BUN SERPL-MCNC: 9.2 MG/DL — SIGNIFICANT CHANGE UP (ref 8–20)
CALCIUM SERPL-MCNC: 8.2 MG/DL — LOW (ref 8.4–10.5)
CHLORIDE SERPL-SCNC: 96 MMOL/L — SIGNIFICANT CHANGE UP (ref 96–108)
CO2 SERPL-SCNC: 26 MMOL/L — SIGNIFICANT CHANGE UP (ref 22–29)
CREAT SERPL-MCNC: 0.82 MG/DL — SIGNIFICANT CHANGE UP (ref 0.5–1.3)
EGFR: 85 ML/MIN/1.73M2 — SIGNIFICANT CHANGE UP
EOSINOPHIL # BLD AUTO: 0.05 K/UL — SIGNIFICANT CHANGE UP (ref 0–0.5)
EOSINOPHIL NFR BLD AUTO: 1.2 % — SIGNIFICANT CHANGE UP (ref 0–6)
GLUCOSE SERPL-MCNC: 100 MG/DL — HIGH (ref 70–99)
HCT VFR BLD CALC: 42 % — SIGNIFICANT CHANGE UP (ref 34.5–45)
HGB BLD-MCNC: 14 G/DL — SIGNIFICANT CHANGE UP (ref 11.5–15.5)
HIV 1 & 2 AB SERPL IA.RAPID: SIGNIFICANT CHANGE UP
IMM GRANULOCYTES NFR BLD AUTO: 0.7 % — SIGNIFICANT CHANGE UP (ref 0–0.9)
LYMPHOCYTES # BLD AUTO: 1.89 K/UL — SIGNIFICANT CHANGE UP (ref 1–3.3)
LYMPHOCYTES # BLD AUTO: 46.9 % — HIGH (ref 13–44)
MCHC RBC-ENTMCNC: 31.1 PG — SIGNIFICANT CHANGE UP (ref 27–34)
MCHC RBC-ENTMCNC: 33.3 GM/DL — SIGNIFICANT CHANGE UP (ref 32–36)
MCV RBC AUTO: 93.3 FL — SIGNIFICANT CHANGE UP (ref 80–100)
MONOCYTES # BLD AUTO: 0.26 K/UL — SIGNIFICANT CHANGE UP (ref 0–0.9)
MONOCYTES NFR BLD AUTO: 6.5 % — SIGNIFICANT CHANGE UP (ref 2–14)
NEUTROPHILS # BLD AUTO: 1.77 K/UL — LOW (ref 1.8–7.4)
NEUTROPHILS NFR BLD AUTO: 44 % — SIGNIFICANT CHANGE UP (ref 43–77)
PLATELET # BLD AUTO: 167 K/UL — SIGNIFICANT CHANGE UP (ref 150–400)
POTASSIUM SERPL-MCNC: 3.7 MMOL/L — SIGNIFICANT CHANGE UP (ref 3.5–5.3)
POTASSIUM SERPL-SCNC: 3.7 MMOL/L — SIGNIFICANT CHANGE UP (ref 3.5–5.3)
RBC # BLD: 4.5 M/UL — SIGNIFICANT CHANGE UP (ref 3.8–5.2)
RBC # FLD: 13.3 % — SIGNIFICANT CHANGE UP (ref 10.3–14.5)
SODIUM SERPL-SCNC: 133 MMOL/L — LOW (ref 135–145)
T PALLIDUM AB TITR SER: NEGATIVE — SIGNIFICANT CHANGE UP
WBC # BLD: 4.03 K/UL — SIGNIFICANT CHANGE UP (ref 3.8–10.5)
WBC # FLD AUTO: 4.03 K/UL — SIGNIFICANT CHANGE UP (ref 3.8–10.5)

## 2024-06-08 PROCEDURE — 99283 EMERGENCY DEPT VISIT LOW MDM: CPT

## 2024-06-08 PROCEDURE — 86703 HIV-1/HIV-2 1 RESULT ANTBDY: CPT

## 2024-06-08 PROCEDURE — 86780 TREPONEMA PALLIDUM: CPT

## 2024-06-08 PROCEDURE — 85025 COMPLETE CBC W/AUTO DIFF WBC: CPT

## 2024-06-08 PROCEDURE — 80048 BASIC METABOLIC PNL TOTAL CA: CPT

## 2024-06-08 PROCEDURE — 99284 EMERGENCY DEPT VISIT MOD MDM: CPT

## 2024-06-08 PROCEDURE — 36415 COLL VENOUS BLD VENIPUNCTURE: CPT

## 2024-06-08 RX ADMIN — Medication 50 MILLIGRAM(S): at 07:48

## 2024-06-08 NOTE — ED ADULT TRIAGE NOTE - CHIEF COMPLAINT QUOTE
Patient presents to ED with c/o worsening body rash all over her body that started on Thursday.  Seen here on Thursday and abdominal pain and rash.  Last Benadryl yesterday.  Denies any new foods/laundry detergent/soap.

## 2024-06-08 NOTE — ED PROVIDER NOTE - ATTENDING APP SHARED VISIT CONTRIBUTION OF CARE
I performed a history and physical exam of the patient and discussed their management with the advanced care provider. I reviewed the advanced care provider's note and agree with the documented findings and plan of care. My medical decision making and objective findings are found below.     54yo female presenting with rash to entire body that is itchy and started 2 days ago. Of note- dx with diverticulitis, however rash started before tx with a bx. No fevers/chills. I performed a history and physical exam of the patient and discussed their management with the advanced care provider. I reviewed the advanced care provider's note and agree with the documented findings and plan of care. My medical decision making and objective findings are found below.     54yo female presenting with rash to entire body that is itchy and started 2 days ago. Of note- dx with diverticulitis, however rash started before tx with a bx. No fevers/chills. Rash includes palms. No oral lesions. Ddx includes allergic rxn vs viral illness, also

## 2024-06-08 NOTE — ED PROVIDER NOTE - PHYSICAL EXAMINATION
Const: Awake, alert and oriented. In no acute distress. Well appearing.  HEENT: NC/AT. Moist mucous membranes. no tongue swelling noted   Eyes: No scleral icterus. EOMI.  Neck:. Soft and supple. Full ROM without pain.  Cardiac: +S1/S2. No murmurs. Peripheral pulses 2+ and symmetric. No LE edema.  Resp: Speaking in full sentences. No evidence of respiratory distress. No wheezes, rales or rhonchi.  Abd: Soft, non-tender, non-distended. Normal bowel sounds in all 4 quadrants. No guarding or rebound.  Back: Spine midline and non-tender. No CVAT.  Skin: diffuse papular rash noted to body   Lymph: No cervical lymphadenopathy.  Neuro: Awake, alert & oriented x 3. Moves all extremities symmetrically.

## 2024-06-08 NOTE — ED ADULT NURSE NOTE - OBJECTIVE STATEMENT
pt care assumed at 0740, no apparent distress noted at this time. pt received A&Ox4 sitting in bed comfortably. pt c/o rash that started 2 days and states rash started on the head and progressed downward. pt has diffuse maculopapular rash and itching with no relief from benadryl. pt denies shortness of breath, throat itching and tongue swelling. pt care assumed at 0740, no apparent distress noted at this time. pt received A&Ox4 sitting in bed comfortably. pt c/o rash that started 2 days and states rash started on the head and progressed downward. pt has diffuse papular rash and itching with no relief from benadryl. pt denies shortness of breath, throat itching and tongue swelling.

## 2024-06-08 NOTE — ED ADULT NURSE NOTE - SUICIDE SCREENING DEPRESSION
I have personally performed a face to face diagnostic evaluation on this patient. I have reviewed the PA note and agree with the history, exam, and plan of care, except as noted. Negative

## 2024-06-08 NOTE — ED PROVIDER NOTE - NSFOLLOWUPCLINICS_GEN_ALL_ED_FT
[facility] - [place]  Dermatology  [address] [place] [id number]  Phone: [phone]  Fax: [phone]

## 2024-06-08 NOTE — ED PROVIDER NOTE - CLINICAL SUMMARY MEDICAL DECISION MAKING FREE TEXT BOX
pt is a 53 year old female presenting to the ed for evaluation. pt was seen at  ED on june 6th for abd pain was diagnosed with diverticulitis started on antibiotics. patient states when she was here on the 6th had rash to her body that was itchy, was told to take benadryl. pt states since the rash has worsened feels its not improving with the benadryl. denies new products at home, or new medications. patient to continue with benadryl for itchy. prednisone, syphilis blood work sent will call patient with results follow up with dermatology

## 2024-06-08 NOTE — ED PROVIDER NOTE - OBJECTIVE STATEMENT
pt is a 53 year old female presenting to the ed for evaluation. pt was seen at  ED on june 6th for abd pain was diagnosed with diverticulitis started on antibiotics. patient states when she was here on the 6th had rash to her body that was itchy, was told to take benadryl. pt states since the rash has worsened feels its not improving with the benadryl. denies new products at home, or new medications.   pt denies cp, sob, tongue swelling, back pain numbness or loss of sensation neck pain headache fever

## 2024-06-08 NOTE — ED PROVIDER NOTE - PROGRESS NOTE DETAILS
test for syphilis placed will call patient with results  continue with benadryl and steroids  follow up with dermatology

## 2024-06-08 NOTE — ED ADULT NURSE NOTE - NSFALLUNIVINTERV_ED_ALL_ED
Bed/Stretcher in lowest position, wheels locked, appropriate side rails in place/Call bell, personal items and telephone in reach/Instruct patient to call for assistance before getting out of bed/chair/stretcher/Non-slip footwear applied when patient is off stretcher/[place] to call system/Physically safe environment - no spills, clutter or unnecessary equipment/Purposeful proactive rounding/Room/bathroom lighting operational, light cord in reach

## 2024-06-08 NOTE — ED PROVIDER NOTE - NSICDXPASTSURGICALHX_GEN_ALL_CORE_FT
Frnacesca Noel  1966  54 y.o.  male    Chief Complaint   Patient presents with    3 Month Follow-Up    Hypertension    Numbness     tingling in hands- carpal tunnel both hands         History of Present Illness  Francesca Noel is a 54 y.o. male who presents today for a check up  Patient Active Problem List   Diagnosis    Hypertension    Hyperlipidemia    Prediabetes    Medial meniscus tear    Bilateral carpal tunnel syndrome       -HTN- Stable  On medications  -B/L paresthesia hands. Last EMG 2016. + CTS. He never followed up.  -R shoulder - chronic- Hx of surgery  -L shoulder -discomfort. Decreased ROM. No know injury    Review of Systems   Constitutional: Negative for diaphoresis and fever. Respiratory: Negative for cough, chest tightness and shortness of breath. Cardiovascular: Negative for chest pain and palpitations. Gastrointestinal: Negative for abdominal pain, constipation, diarrhea and nausea. Genitourinary: Negative for difficulty urinating. Musculoskeletal: Negative for back pain and gait problem. Neurological: Negative for dizziness and headaches. Psychiatric/Behavioral: Negative for dysphoric mood.        No Known Allergies    Past Medical History:   Diagnosis Date    Bilateral carpal tunnel syndrome     CAD (coronary artery disease)     H/O echocardiogram 04/28/2016    EF 55% WNL- Stage 1 dysfunction     Hyperlipidemia     Hypertension     Medial meniscus tear     Left knee    Prediabetes        Past Surgical History:   Procedure Laterality Date    APPENDECTOMY      SHOULDER SURGERY      right        Family History   Problem Relation Age of Onset    Cancer Mother         lung    Heart Disease Father        Social History     Tobacco Use    Smoking status: Never Smoker    Smokeless tobacco: Former User     Types: Chew   Substance Use Topics    Alcohol use: Not Currently     Alcohol/week: 15.0 standard drinks     Types: 12 Cans of beer, 3 Shots of liquor per week Comment: Only on the Medium Drug use: No       Current Outpatient Medications   Medication Sig Dispense Refill    lisinopril (PRINIVIL;ZESTRIL) 20 MG tablet Take 1 tablet by mouth daily 90 tablet 1    ibuprofen (ADVIL;MOTRIN) 800 MG tablet Take 1 tablet by mouth every 8 hours as needed for Pain 90 tablet 1    aspirin 81 MG tablet Take 81 mg by mouth daily      nitroGLYCERIN (NITROSTAT) 0.4 MG SL tablet up to max of 3 total doses. If no relief after 1 dose, call 911. (Patient not taking: Reported on 5/5/2021) 25 tablet 3     No current facility-administered medications for this visit. OBJECTIVE:    /88   Pulse 100   Temp 97.1 °F (36.2 °C)   Wt 235 lb (106.6 kg)   SpO2 100%   BMI 35.73 kg/m²     Physical Exam  Vitals signs reviewed. Constitutional:       General: He is not in acute distress. Eyes:      Conjunctiva/sclera: Conjunctivae normal.   Neck:      Musculoskeletal: Neck supple. Cardiovascular:      Rate and Rhythm: Normal rate and regular rhythm. Pulmonary:      Effort: Pulmonary effort is normal. No respiratory distress. Breath sounds: Normal breath sounds. Abdominal:      General: Bowel sounds are normal.      Palpations: Abdomen is soft. Tenderness: There is no abdominal tenderness. Musculoskeletal:      Right lower leg: No edema. Left lower leg: No edema. Neurological:      Mental Status: He is alert and oriented to person, place, and time. Cranial Nerves: No cranial nerve deficit. Psychiatric:         Mood and Affect: Mood normal.         ASSESSMENT:  1. Essential hypertension    2. Chronic left shoulder pain    3.  Bilateral carpal tunnel syndrome        PLAN:    Orders Placed This Encounter   Procedures    XR SHOULDER LEFT (MIN 2 VIEWS)       Orders Placed This Encounter   Medications    lisinopril (PRINIVIL;ZESTRIL) 20 MG tablet     Sig: Take 1 tablet by mouth daily     Dispense:  90 tablet     Refill:  1     D/C  Lisinopril 10   Obtain Xray  Continue medications  Pt to call to follow up with Dr. Genesis Nunes       Return for Follow up in 5 to 6 months.     Electronically Signed by Karla Garcia DO PAST SURGICAL HISTORY:  S/P cholecystectomy

## 2024-06-08 NOTE — ED PROVIDER NOTE - PATIENT PORTAL LINK FT
You can access the FollowMyHealth Patient Portal offered by [facility] by registering at the following website: [email or web address]. By joining [employer]’s FollowMyHealth portal, you will also be able to view your health information using other applications (apps) compatible with our system.

## 2024-12-27 NOTE — ED ADULT NURSE NOTE - PAIN RATING/NUMBER SCALE (0-10): ACTIVITY
ER Provider Note    Scribed for Panchito Bah M.d. by Kacy Cruz. 12/26/2024  8:13 PM    Primary Care Provider: Lakia Lanza M.D.    CHIEF COMPLAINT   Chief Complaint   Patient presents with    Chest Pain    Dizziness    Nausea     Pt states sx onset 30 min ago     EXTERNAL RECORDS REVIEWED  Patient has a history of coronary artery disease, status post stenting X2 in November of this year. He is supposed to be on aspirin and clavix.     HPI/ROS  LIMITATION TO HISTORY   Select: : None  OUTSIDE HISTORIAN(S):  None    Abdulaziz Shaffer Jr. is a 51 y.o. male with a history of coronary artery disease who presents to the ED complaining of epigastric pain onset a few hours ago. He explains that at the onset of the pain, he began experiencing nausea with some vertigo, followed by an episode of vomiting. His pain did not radiate anywhere, and he explains is more localized to his upper abdomen. He reportedly felt better after vomiting. He took a dose of nitroglycerin. He states these symptoms today did not feel similar to previous heart attack. He has had some diarrhea, and reports that he has a few sick contacts with nausea and vomiting also. He denies dysuria. At beside, he is not feeling nauseated. He denies any leg swelling or swelling in his feet. No shortness of breath. The patient takes jardiance for diabetes. He reportedly is compliant with all cardiac medication.     PAST MEDICAL HISTORY  Past Medical History:   Diagnosis Date    Diabetes (HCC)     MI (myocardial infarction) (HCC)        SURGICAL HISTORY  History reviewed. No pertinent surgical history.    FAMILY HISTORY  Family History   Problem Relation Age of Onset    Valvular heart disease Mother     Heart Attack Father 50       SOCIAL HISTORY   reports that he has never smoked. He has never used smokeless tobacco. He reports that he does not drink alcohol and does not use drugs.    CURRENT MEDICATIONS  Previous Medications    ASPIRIN (ASA) 81  MG CHEW TAB CHEWABLE TABLET    Take 81 mg by mouth every day.    ATORVASTATIN (LIPITOR) 80 MG TABLET    Take 1 Tablet by mouth every evening.    JARDIANCE 10 MG TAB TABLET    Take 10 mg by mouth every day.    LISINOPRIL (PRINIVIL) 5 MG TAB    Take 2.5 mg by mouth every day.    NITROGLYCERIN (NITROSTAT) 0.4 MG SL TAB    Place 0.4 mg under the tongue as needed for Chest Pain.    PLAVIX 75 MG TAB    Take 75 mg by mouth every day.    TOPROL XL 25 MG TABLET SR 24 HR    Take 25 mg by mouth every day.       ALLERGIES  Patient has no known allergies.    PHYSICAL EXAM  BP 95/61   Pulse 81   Temp 35.8 °C (96.5 °F) (Temporal)   Resp 18   Wt 82.6 kg (182 lb)   SpO2 95%   BMI 29.38 kg/m²   Constitutional: Awake and alert  HENT: Normal inspection  Eyes: Normal inspection. No nystagmus.   Neck: Grossly normal range of motion.  Cardiovascular: Normal heart rate, Normal rhythm.  Symmetric peripheral pulses.   Thorax & Lungs: No respiratory distress, No wheezing, No rales, No rhonchi, No chest tenderness.   Abdomen: Mild tenderness to palpation in the epigastric region with no rebound or guarding. Bowel sounds normal, soft, non-distended, no mass  Skin: No obvious rash.  Back: No tenderness, No CVA tenderness.   Extremities: No clubbing, cyanosis, edema, no Homans or cords.  Neurologic: Grossly normal   Psychiatric: Normal for situation    DIAGNOSTIC STUDIES    EKG/LABS  Labs Reviewed   CBC WITH DIFFERENTIAL - Abnormal; Notable for the following components:       Result Value    RBC 6.27 (*)     Hematocrit 52.3 (*)     Neutrophils-Polys 84.60 (*)     Lymphocytes 6.90 (*)     Neutrophils (Absolute) 7.84 (*)     Lymphs (Absolute) 0.64 (*)     All other components within normal limits   COMP METABOLIC PANEL - Abnormal; Notable for the following components:    Anion Gap 17.0 (*)     Glucose 130 (*)     All other components within normal limits   PROBRAIN NATRIURETIC PEPTIDE, NT   TROPONIN   ESTIMATED GFR   TROPONIN   POCT COV-2,  FLU A/B, RSV BY PCR   POC COV-2, FLU A/B, RSV BY PCR     Results for orders placed or performed during the hospital encounter of 24   EKG   Result Value Ref Range    Report       [facility] Emergency Dept.    Test Date:  2024  Pt Name:    BUCK ABREU              Department: ER  MRN:        [id number]                      Room:  Gender:     Male                         Technician: [id number]  :        1973                   Requested By:ER TRIAGE PROTOCOL  Order #:    [id number]                    Reading MD:    Measurements  Intervals                                Axis  Rate:       80                           P:          43  WV:         158                          QRS:        36  QRSD:       90                           T:          44  QT:         363  QTc:        419    Interpretive Statements  Sinus rhythm  Consider anterior infarct  Compared to ECG 12/10/2024 14:57:05  Myocardial infarct finding now present  Sinus bradycardia no longer present  Right-axis deviation no longer present         I have independently interpreted this EKG.  Patient has Q waves, no ST elevations in the anterior precordial leads concerning for acute MI.  I disagree with the above computer-generated interpretation.  Do not see acute ischemic changes at this time    RADIOLOGY/PROCEDURES   The attending emergency physician has independently interpreted the diagnostic imaging associated with this visit and am waiting the final reading from the radiologist.   My preliminary interpretation is a follows: No evidence of focal consolidation, no pneumomediastinum    Radiologist interpretation:  DX-CHEST-PORTABLE (1 VIEW)   Final Result      Hypoinflation without other evidence for acute cardiopulmonary disease.          COURSE & MEDICAL DECISION MAKING     ASSESSMENT, COURSE AND PLAN  Care Narrative:   CHEST PAIN:   HEART Score for Major Cardiac Events  HEART Score     History: Slightly suspicious  ECG:  Non-specific repolarization disturbance  Age: 45-64  Risk Factors: >2 risk factors or hx of atherosclerotic disease  Troponin: Less than or equal to normal limit    Heart Score: 4    Total Score   0-3 Points = Low Score, risk of MACE 0.9-1.7%.  4-6 Points = Moderate Score, risk of MACE 12-16.6%  7-10 Points = High Score, risk of MACE 50-65%      8:13 PM- Patient seen and examined at bedside. He has a history of coronary artery disease, status post stenting X2 in November of this year. He presents presents today complaining of epigastric pain onset a few hours ago. He explains that at the onset of the pain, he began experiencing nausea with some vertigo, followed by an episode of vomiting. No radicular symptoms of pain. On exam he has some epigastric tenderness with no rebound or guarding. No nystagmus. Discussed plan of care, including lab work and imaging. Patient agrees to the plan of care. The patient will be medicated with pepcid, zofran, and GI cocktail. Ordered for DX sarah, POCT CoV Flu A/B, troponin, CBC w/ differential, CMP, proB suzanne peptide, EKG to evaluate his symptoms.     Labs reviewed showed no significant leukocytosis, largely normal metabolic panel besides a mildly elevated glucose.  BNP and troponin negative.  Viral studies negative.  Chest x-ray did not show any signs of acute cardiopulmonary process.  No evidence of pneumomediastinum.    9:38 PM- Patient was reevaluated at bedside. He pain has entirely resolved after GI cocktail, Pepcid, Zofran.  Was given full dose aspirin.  Waiting on second troponin at this time, plans for PO challenge also.     9:57 PM  Repeat troponin negative, downtrending.  Patient reassessed and remained epigastric pain-free.  Again, patient states that this feels nothing like his previous MI, is associated with sick contacts, nausea and vomiting.  Patient tolerating orals here.  Shared decision making made because patient does have mildly elevated heart score of 4, patient  wishes to follow-up with his cardiologist whom he is well-established with.      ADDITIONAL PROBLEM LIST  None    DISPOSITION AND DISCUSSIONS  I have discussed management of the patient with the following physicians and DANIKA's: None    Discussion of management with other Q or appropriate source(s): None     Escalation of care considered, and ultimately not performed: after discussion with the patient / family, they have elected to decline an escalation in care.    Barriers to care at this time, including but not limited to:  None .       FINAL DIANGOSIS  1. Nausea and vomiting, unspecified vomiting type Acute   2. Epigastric abdominal pain Acute   3. Diarrhea, unspecified type Acute       The note accurately reflects work and decisions made by me.  Panchito Bah M.D.  12/26/2024  11:14 PM     5 (moderate pain)

## 2025-01-11 ENCOUNTER — EMERGENCY (EMERGENCY)
Facility: HOSPITAL | Age: 55
LOS: 1 days | Discharge: HOME | End: 2025-01-11
Attending: EMERGENCY MEDICINE
Payer: COMMERCIAL

## 2025-01-11 VITALS
SYSTOLIC BLOOD PRESSURE: 137 MMHG | HEART RATE: 84 BPM | RESPIRATION RATE: 18 BRPM | TEMPERATURE: 98 F | HEIGHT: 62 IN | WEIGHT: 214.51 LBS | DIASTOLIC BLOOD PRESSURE: 86 MMHG | OXYGEN SATURATION: 95 %

## 2025-01-11 DIAGNOSIS — Z90.49 ACQUIRED ABSENCE OF OTHER SPECIFIED PARTS OF DIGESTIVE TRACT: Chronic | ICD-10-CM

## 2025-01-11 LAB
ALBUMIN SERPL ELPH-MCNC: 3.8 G/DL — SIGNIFICANT CHANGE UP (ref 3.3–5.2)
ALP SERPL-CCNC: 83 U/L — SIGNIFICANT CHANGE UP (ref 40–120)
ALT FLD-CCNC: 14 U/L — SIGNIFICANT CHANGE UP
ANION GAP SERPL CALC-SCNC: 8 MMOL/L — SIGNIFICANT CHANGE UP (ref 5–17)
APPEARANCE UR: CLEAR — SIGNIFICANT CHANGE UP
AST SERPL-CCNC: 20 U/L — SIGNIFICANT CHANGE UP
BACTERIA # UR AUTO: ABNORMAL /HPF
BASOPHILS # BLD AUTO: 0.08 K/UL — SIGNIFICANT CHANGE UP (ref 0–0.2)
BASOPHILS NFR BLD AUTO: 1.5 % — SIGNIFICANT CHANGE UP (ref 0–2)
BILIRUB SERPL-MCNC: 0.2 MG/DL — LOW (ref 0.4–2)
BILIRUB UR-MCNC: NEGATIVE — SIGNIFICANT CHANGE UP
BUN SERPL-MCNC: 9.5 MG/DL — SIGNIFICANT CHANGE UP (ref 8–20)
CALCIUM SERPL-MCNC: 8.8 MG/DL — SIGNIFICANT CHANGE UP (ref 8.4–10.5)
CAST: 2 /LPF — SIGNIFICANT CHANGE UP (ref 0–4)
CHLORIDE SERPL-SCNC: 106 MMOL/L — SIGNIFICANT CHANGE UP (ref 96–108)
CO2 SERPL-SCNC: 28 MMOL/L — SIGNIFICANT CHANGE UP (ref 22–29)
COLOR SPEC: YELLOW — SIGNIFICANT CHANGE UP
CREAT SERPL-MCNC: 0.71 MG/DL — SIGNIFICANT CHANGE UP (ref 0.5–1.3)
DIFF PNL FLD: NEGATIVE — SIGNIFICANT CHANGE UP
EGFR: 101 ML/MIN/1.73M2 — SIGNIFICANT CHANGE UP
EOSINOPHIL # BLD AUTO: 0.29 K/UL — SIGNIFICANT CHANGE UP (ref 0–0.5)
EOSINOPHIL NFR BLD AUTO: 5.5 % — SIGNIFICANT CHANGE UP (ref 0–6)
GLUCOSE SERPL-MCNC: 104 MG/DL — HIGH (ref 70–99)
GLUCOSE UR QL: NEGATIVE MG/DL — SIGNIFICANT CHANGE UP
HCT VFR BLD CALC: 40.8 % — SIGNIFICANT CHANGE UP (ref 34.5–45)
HGB BLD-MCNC: 13.7 G/DL — SIGNIFICANT CHANGE UP (ref 11.5–15.5)
IMM GRANULOCYTES NFR BLD AUTO: 0.8 % — SIGNIFICANT CHANGE UP (ref 0–0.9)
KETONES UR-MCNC: NEGATIVE MG/DL — SIGNIFICANT CHANGE UP
LEUKOCYTE ESTERASE UR-ACNC: ABNORMAL
LIDOCAIN IGE QN: 34 U/L — SIGNIFICANT CHANGE UP (ref 22–51)
LYMPHOCYTES # BLD AUTO: 2.65 K/UL — SIGNIFICANT CHANGE UP (ref 1–3.3)
LYMPHOCYTES # BLD AUTO: 50 % — HIGH (ref 13–44)
MCHC RBC-ENTMCNC: 32.1 PG — SIGNIFICANT CHANGE UP (ref 27–34)
MCHC RBC-ENTMCNC: 33.6 G/DL — SIGNIFICANT CHANGE UP (ref 32–36)
MCV RBC AUTO: 95.6 FL — SIGNIFICANT CHANGE UP (ref 80–100)
MONOCYTES # BLD AUTO: 0.42 K/UL — SIGNIFICANT CHANGE UP (ref 0–0.9)
MONOCYTES NFR BLD AUTO: 7.9 % — SIGNIFICANT CHANGE UP (ref 2–14)
NEUTROPHILS # BLD AUTO: 1.82 K/UL — SIGNIFICANT CHANGE UP (ref 1.8–7.4)
NEUTROPHILS NFR BLD AUTO: 34.3 % — LOW (ref 43–77)
NITRITE UR-MCNC: NEGATIVE — SIGNIFICANT CHANGE UP
PH UR: 6 — SIGNIFICANT CHANGE UP (ref 5–8)
PLATELET # BLD AUTO: 311 K/UL — SIGNIFICANT CHANGE UP (ref 150–400)
POTASSIUM SERPL-MCNC: 3.8 MMOL/L — SIGNIFICANT CHANGE UP (ref 3.5–5.3)
POTASSIUM SERPL-SCNC: 3.8 MMOL/L — SIGNIFICANT CHANGE UP (ref 3.5–5.3)
PROT SERPL-MCNC: 6.3 G/DL — LOW (ref 6.6–8.7)
PROT UR-MCNC: NEGATIVE MG/DL — SIGNIFICANT CHANGE UP
RBC # BLD: 4.27 M/UL — SIGNIFICANT CHANGE UP (ref 3.8–5.2)
RBC # FLD: 13.3 % — SIGNIFICANT CHANGE UP (ref 10.3–14.5)
RBC CASTS # UR COMP ASSIST: 1 /HPF — SIGNIFICANT CHANGE UP (ref 0–4)
SODIUM SERPL-SCNC: 142 MMOL/L — SIGNIFICANT CHANGE UP (ref 135–145)
SP GR SPEC: 1.02 — SIGNIFICANT CHANGE UP (ref 1–1.03)
SQUAMOUS # UR AUTO: 13 /HPF — HIGH (ref 0–5)
UROBILINOGEN FLD QL: 1 MG/DL — SIGNIFICANT CHANGE UP (ref 0.2–1)
WBC # BLD: 5.3 K/UL — SIGNIFICANT CHANGE UP (ref 3.8–10.5)
WBC # FLD AUTO: 5.3 K/UL — SIGNIFICANT CHANGE UP (ref 3.8–10.5)
WBC UR QL: 6 /HPF — HIGH (ref 0–5)

## 2025-01-11 PROCEDURE — 99285 EMERGENCY DEPT VISIT HI MDM: CPT

## 2025-01-11 PROCEDURE — 96374 THER/PROPH/DIAG INJ IV PUSH: CPT | Mod: XU

## 2025-01-11 PROCEDURE — 74177 CT ABD & PELVIS W/CONTRAST: CPT | Mod: MC

## 2025-01-11 PROCEDURE — 36415 COLL VENOUS BLD VENIPUNCTURE: CPT

## 2025-01-11 PROCEDURE — 83690 ASSAY OF LIPASE: CPT

## 2025-01-11 PROCEDURE — 93010 ELECTROCARDIOGRAM REPORT: CPT

## 2025-01-11 PROCEDURE — 85025 COMPLETE CBC W/AUTO DIFF WBC: CPT

## 2025-01-11 PROCEDURE — 80053 COMPREHEN METABOLIC PANEL: CPT

## 2025-01-11 PROCEDURE — 99285 EMERGENCY DEPT VISIT HI MDM: CPT | Mod: 25

## 2025-01-11 PROCEDURE — 93005 ELECTROCARDIOGRAM TRACING: CPT

## 2025-01-11 PROCEDURE — 74177 CT ABD & PELVIS W/CONTRAST: CPT | Mod: 26

## 2025-01-11 PROCEDURE — 81001 URINALYSIS AUTO W/SCOPE: CPT

## 2025-01-11 RX ORDER — KETOROLAC TROMETHAMINE 30 MG/ML
15 INJECTION INTRAMUSCULAR; INTRAVENOUS ONCE
Refills: 0 | Status: DISCONTINUED | OUTPATIENT
Start: 2025-01-11 | End: 2025-01-11

## 2025-01-11 RX ADMIN — KETOROLAC TROMETHAMINE 15 MILLIGRAM(S): 30 INJECTION INTRAMUSCULAR; INTRAVENOUS at 12:27

## 2025-01-11 RX ADMIN — KETOROLAC TROMETHAMINE 15 MILLIGRAM(S): 30 INJECTION INTRAMUSCULAR; INTRAVENOUS at 13:15

## 2025-01-11 NOTE — ED PROVIDER NOTE - ATTENDING CONTRIBUTION TO CARE
I performed a face to face bedside interview with patient regarding history of present illness, review of symptoms and past medical history. I completed an independent physical exam.  I have discussed patient's plan of care with resident.   I agree with note as stated above including HISTORY OF PRESENT ILLNESS, HIV, PAST MEDICAL/SURGICAL/FAMILY/SOCIAL HISTORY, ALLERGIES AND HOME MEDICATIONS, REVIEW OF SYSTEMS, PHYSICAL EXAM, MEDICAL DECISION MAKING and any PROGRESS NOTES during the time I functioned as the attending physician for this patient unless otherwise noted. My brief assessment is as follows: General: NAD, well appearing  HEENT: Normocephalic, atraumatic, EOM intact  Neck: No apparent stiffness or JVD  Pulm: Chest wall symmetric and nontender, lungs clear to ascultation, no wheezes, rhales, SUJATA  Cardiac: Regular rate and regular rhythm, 2+ radial pulses bilaterally  Abdomen: LUQ TTP, no overlying skin changes, abdomen soft, obese, no CVAT  Skin: Skin is warm, dry and intact without rashes or lesions.  Neuro: No motor or sensory deficits above reported baseline, AOx3, no facial droop, no dysarthria, moves all extremities, symmetric sensation to bilateral extremities   MSK: No deformity or tenderness above reported baseline, no LE pitting edema

## 2025-01-11 NOTE — ED ADULT NURSE NOTE - OBJECTIVE STATEMENT
Pt is a 54YOF who is here with abdominal pain x 2 months, pt has been dealing with the pain and managing with home remedies without success, she has made an appointment with a GI physician but the appointment is not until June of this year, pt denies any fevers, chills, nausea, vomiting, diarrhea, bloody stools, endorses pressure that is almost constant, pt states she has a hx of GERD but has been treated and states no complications.

## 2025-01-11 NOTE — ED ADULT TRIAGE NOTE - CHIEF COMPLAINT QUOTE
pt c/o left side abdominal pain x 1 month, have a CT appointment for June, can't wait  A&Ox3, resp wnl

## 2025-01-11 NOTE — ED PROVIDER NOTE - PHYSICAL EXAMINATION
General: NAD, well appearing  HEENT: Normocephalic, atraumatic, EOM intact  Neck: No apparent stiffness or JVD  Pulm: Chest wall symmetric and nontender, lungs clear to ascultation, no wheezes, rhales, SUJATA  Cardiac: Regular rate and regular rhythm, 2+ radial pulses bilaterally  Abdomen: LUQ TTP, no overlying skin changes, abdomen soft, obese, no CVAT  Skin: Skin is warm, dry and intact without rashes or lesions.  Neuro: No motor or sensory deficits above reported baseline, AOx3, no facial droop, no dysarthria, moves all extremities, symmetric sensation to bilateral extremities   MSK: No deformity or tenderness above reported baseline, no LE pitting edema

## 2025-01-11 NOTE — ED PROVIDER NOTE - PATIENT PORTAL LINK FT
You can access the FollowMyHealth Patient Portal offered by [facility] by registering at the following website: [email or web address]. By joining [facility]’s FollowMyHealth portal, you will also be able to view your health information using other applications (apps) compatible with our system.

## 2025-01-11 NOTE — ED PROVIDER NOTE - NSICDXFAMILYHX_GEN_ALL_CORE_FT
FAMILY HISTORY:  Mother  Still living? Unknown  Family history of gastric cancer, Age at diagnosis: Age Unknown  FH: CAD (coronary artery disease), Age at diagnosis: Age Unknown

## 2025-01-11 NOTE — ED PROVIDER NOTE - PROGRESS NOTE DETAILS
Pt reporting pain improved after toradol. Labs grossly WNL. Pending CT. Nora Wesley, DO: patient received at sign out pending CT. Results nonactionable, no acute findings. Reviewed with patient. She will f/u with gastroenterology at established appointments in May and in June (2 different gastroenterologists). Supportive care advised. Stable for dc home.

## 2025-01-11 NOTE — ED PROVIDER NOTE - NSFOLLOWUPINSTRUCTIONS_ED_ALL_ED_FT
- FOR PAIN: You may take Tylenol 1000mg every 6 hours or Ibuprofen 600mg every 6 hours as needed. It is safe to combine these medications should you need to take both.  - Stay well hydrated  - Follow up with the gastroenterologist as scheduled. Bring all results of today's visit with you.     Good luck!

## 2025-01-11 NOTE — ED PROVIDER NOTE - CLINICAL SUMMARY MEDICAL DECISION MAKING FREE TEXT BOX
55 yo F w PMH pituitary tumor on OCP, prednisone, levothyroxine, diverticulitis, GERD, s/p cholecystectomy, presenting with 1 month history of LUQ pain, 10lb unintentional weight loss.  On exam pt HDS, SUJATA, RRR, abdomen soft, LUQ significantly tender to palpation with no overlying skin changes. Concern for paresthesia vs intraabdominal pathology. Ordered pain control, basic labs, CT AP w IV contrast. 55 yo F w PMH pituitary tumor on OCP, prednisone, levothyroxine, diverticulitis, GERD, s/p cholecystectomy, presenting with 1 month history of LUQ pain, 10lb unintentional weight loss.  On exam pt HDS, SUJATA, RRR, abdomen soft, LUQ significantly tender to palpation with no overlying skin changes. Concern for paresthesia vs intraabdominal pathology. Ordered pain control, basic labs, CT AP w IV contrast

## 2025-01-11 NOTE — ED ADULT NURSE NOTE - CAS DISCH CONDITION
Patient states she would like to establish with Blossom Hinkle in [place]; however she tested positive for covid on 10/11 according to her chart.  Patient states her symptoms started 10/4 and she would like to see provider asap because she has low blood oxygen levels and ER doctor told her she needs steroids for this.  Patient would like to see NP this Monday 10/18 @ [facility]. Please advise if that is ok.     (She also has two adult children that she would like to establish with Blossom Hinkle in which she has guardianship.)   Stable

## 2025-01-11 NOTE — ED PROVIDER NOTE - OBJECTIVE STATEMENT
53 yo F w PMH pituitary tumor on OCP, prednisone, levothyroxine, GERD, s/p cholecystectomy, presenting with 1 month history of LUQ pain. Pt reports she has had a similar pain over the last few years which resolve, but for the last month she reports the pain has remained constant. Pain not associated with food intake. Pain minimally improved with ibuprofen, acetaminophen, skin creams, or selzer. Pt has a GI appt in May and June but reports she cannot wait due to pain.  Endorses loss of appetite and 10 lb unintentional weight loss over the last 2 months. Pt last underwent colonoscopy and EGD at age 19 Of note her mother has history of gastric cancer.

## 2025-06-17 NOTE — ED ADULT NURSE NOTE - NSIMPLEMENTINTERV_GEN_ALL_ED
RN contacted Dr. Heard in regards to patient requesting pain medications but is NPO and not due for IV pain medications. RN reached out to make sure it was okay to give oral medications despite patient being NPO.    Implemented All Universal Safety Interventions:  [place] to call system. Call bell, personal items and telephone within reach. Instruct patient to call for assistance. Room bathroom lighting operational. Non-slip footwear when patient is off stretcher. Physically safe environment: no spills, clutter or unnecessary equipment. Stretcher in lowest position, wheels locked, appropriate side rails in place.

## 2025-07-05 ENCOUNTER — OUTPATIENT (OUTPATIENT)
Dept: OUTPATIENT SERVICES | Facility: HOSPITAL | Age: 55
LOS: 1 days | End: 2025-07-05
Payer: COMMERCIAL

## 2025-07-05 ENCOUNTER — APPOINTMENT (OUTPATIENT)
Dept: MAMMOGRAPHY | Facility: CLINIC | Age: 55
End: 2025-07-05
Payer: COMMERCIAL

## 2025-07-05 DIAGNOSIS — Z12.31 ENCOUNTER FOR SCREENING MAMMOGRAM FOR MALIGNANT NEOPLASM OF BREAST: ICD-10-CM

## 2025-07-05 DIAGNOSIS — Z90.49 ACQUIRED ABSENCE OF OTHER SPECIFIED PARTS OF DIGESTIVE TRACT: Chronic | ICD-10-CM

## 2025-07-05 PROCEDURE — 77063 BREAST TOMOSYNTHESIS BI: CPT

## 2025-07-05 PROCEDURE — 77067 SCR MAMMO BI INCL CAD: CPT | Mod: 26

## 2025-07-05 PROCEDURE — 77063 BREAST TOMOSYNTHESIS BI: CPT | Mod: 26

## 2025-07-05 PROCEDURE — 77067 SCR MAMMO BI INCL CAD: CPT

## 2025-07-10 NOTE — ED PROVIDER NOTE - PRINCIPAL DIAGNOSIS
Post Virtual Visit Testing - [employer] scheduling       The provider who conducted your recent virtual visit has recommended additional testing for you.The testing is only valid for 3 days from your appointment. You can schedule your appointment to get tested in one of two ways:     Online at [email or web address] - Select the scheduling alert on the home page, or select Visits from the top navigation, then select Schedule an Appointment to get started.     With the [employer] mobile yani - Tap the My Chart button on the bottom of the home screen, then tap Appointments and Schedule an Appointment.     Thank you-     [facility]     Rash